# Patient Record
Sex: FEMALE | Race: WHITE | Employment: UNEMPLOYED | URBAN - METROPOLITAN AREA
[De-identification: names, ages, dates, MRNs, and addresses within clinical notes are randomized per-mention and may not be internally consistent; named-entity substitution may affect disease eponyms.]

---

## 2022-03-18 PROBLEM — K57.30 DIVERTICULOSIS OF LARGE INTESTINE WITHOUT DIVERTICULITIS: Status: ACTIVE | Noted: 2022-03-03

## 2022-03-18 PROBLEM — I50.31 ACUTE DIASTOLIC HEART FAILURE (HCC): Status: ACTIVE | Noted: 2022-03-03

## 2022-03-18 PROBLEM — N95.2 ATROPHIC VAGINITIS: Status: ACTIVE | Noted: 2022-03-03

## 2022-03-18 PROBLEM — R26.89 IMPAIRMENT OF BALANCE: Status: ACTIVE | Noted: 2022-03-03

## 2022-03-18 PROBLEM — F41.9 CHRONIC ANXIETY: Status: ACTIVE | Noted: 2022-03-03

## 2022-03-18 PROBLEM — I10 HYPERTENSION: Status: ACTIVE | Noted: 2022-02-04

## 2022-03-18 PROBLEM — G45.9 TRANSIENT ISCHEMIC ATTACK: Status: ACTIVE | Noted: 2018-03-20

## 2022-03-19 PROBLEM — R07.9 CHEST PAIN: Status: ACTIVE | Noted: 2022-03-03

## 2022-03-19 PROBLEM — R00.2 PALPITATIONS: Status: ACTIVE | Noted: 2022-03-03

## 2022-03-19 PROBLEM — H90.8 MIXED CONDUCTIVE AND SENSORINEURAL HEARING LOSS: Status: ACTIVE | Noted: 2022-03-03

## 2022-03-19 PROBLEM — J44.9 CHRONIC OBSTRUCTIVE PULMONARY DISEASE (HCC): Status: ACTIVE | Noted: 2022-02-04

## 2022-03-19 PROBLEM — H91.93 BILATERAL HEARING LOSS: Status: ACTIVE | Noted: 2022-02-04

## 2022-03-19 PROBLEM — I47.29 NONSUSTAINED VENTRICULAR TACHYCARDIA: Status: ACTIVE | Noted: 2022-03-03

## 2022-03-19 PROBLEM — N18.30 STAGE 3 CHRONIC KIDNEY DISEASE (HCC): Status: ACTIVE | Noted: 2022-02-04

## 2022-03-19 PROBLEM — L40.9 PSORIASIS: Status: ACTIVE | Noted: 2022-02-04

## 2022-03-19 PROBLEM — E06.3 HASHIMOTO'S THYROIDITIS: Status: ACTIVE | Noted: 2022-03-03

## 2022-03-19 PROBLEM — R33.9 RETENTION OF URINE: Status: ACTIVE | Noted: 2022-03-03

## 2022-03-20 PROBLEM — R19.8 DISORDER OF ABDOMEN: Status: ACTIVE | Noted: 2022-03-03

## 2022-03-20 PROBLEM — I48.91 ATRIAL FIBRILLATION (HCC): Status: ACTIVE | Noted: 2018-03-20

## 2022-10-02 ENCOUNTER — HOSPITAL ENCOUNTER (INPATIENT)
Age: 77
LOS: 1 days | Discharge: HOME HEALTH | DRG: 092 | End: 2022-10-04
Attending: EMERGENCY MEDICINE | Admitting: FAMILY MEDICINE
Payer: MEDICARE

## 2022-10-02 ENCOUNTER — APPOINTMENT (OUTPATIENT)
Dept: CT IMAGING | Age: 77
DRG: 092 | End: 2022-10-02
Payer: MEDICARE

## 2022-10-02 ENCOUNTER — APPOINTMENT (OUTPATIENT)
Dept: GENERAL RADIOLOGY | Age: 77
DRG: 092 | End: 2022-10-02
Payer: MEDICARE

## 2022-10-02 DIAGNOSIS — R47.9 SPEECH DISTURBANCE, UNSPECIFIED TYPE: ICD-10-CM

## 2022-10-02 DIAGNOSIS — R41.0 CONFUSION: Primary | ICD-10-CM

## 2022-10-02 PROBLEM — L40.9 PSORIASIS: Status: ACTIVE | Noted: 2022-02-04

## 2022-10-02 PROBLEM — F41.9 CHRONIC ANXIETY: Status: ACTIVE | Noted: 2022-03-03

## 2022-10-02 PROBLEM — J44.9 CHRONIC OBSTRUCTIVE PULMONARY DISEASE (HCC): Status: ACTIVE | Noted: 2022-02-04

## 2022-10-02 PROBLEM — T24.001A BURN OF RIGHT LEG: Status: ACTIVE | Noted: 2022-10-02

## 2022-10-02 PROBLEM — R47.1 DYSARTHRIA: Status: ACTIVE | Noted: 2022-10-02

## 2022-10-02 PROBLEM — H90.8 MIXED CONDUCTIVE AND SENSORINEURAL HEARING LOSS: Status: ACTIVE | Noted: 2022-03-03

## 2022-10-02 PROBLEM — I10 HYPERTENSION: Status: ACTIVE | Noted: 2022-02-04

## 2022-10-02 PROBLEM — I48.91 ATRIAL FIBRILLATION (HCC): Status: ACTIVE | Noted: 2018-03-20

## 2022-10-02 LAB
ALBUMIN SERPL-MCNC: 3.4 G/DL (ref 3.2–4.6)
ALBUMIN/GLOB SERPL: 0.9 {RATIO} (ref 1.2–3.5)
ALP SERPL-CCNC: 56 U/L (ref 50–136)
ALT SERPL-CCNC: 16 U/L (ref 12–65)
ANION GAP SERPL CALC-SCNC: 5 MMOL/L (ref 4–13)
AST SERPL-CCNC: 20 U/L (ref 15–37)
BASOPHILS # BLD: 0 K/UL (ref 0–0.2)
BASOPHILS NFR BLD: 1 % (ref 0–2)
BILIRUB SERPL-MCNC: 1.3 MG/DL (ref 0.2–1.1)
BILIRUB UR QL: NEGATIVE
BUN SERPL-MCNC: 17 MG/DL (ref 8–23)
CALCIUM SERPL-MCNC: 9.7 MG/DL (ref 8.3–10.4)
CHLORIDE SERPL-SCNC: 103 MMOL/L (ref 101–110)
CO2 SERPL-SCNC: 30 MMOL/L (ref 21–32)
CREAT SERPL-MCNC: 1 MG/DL (ref 0.6–1)
DIFFERENTIAL METHOD BLD: ABNORMAL
EOSINOPHIL # BLD: 0.2 K/UL (ref 0–0.8)
EOSINOPHIL NFR BLD: 3 % (ref 0.5–7.8)
ERYTHROCYTE [DISTWIDTH] IN BLOOD BY AUTOMATED COUNT: 12.9 % (ref 11.9–14.6)
GLOBULIN SER CALC-MCNC: 3.9 G/DL (ref 2.3–3.5)
GLUCOSE SERPL-MCNC: 101 MG/DL (ref 65–100)
GLUCOSE UR QL STRIP.AUTO: NEGATIVE MG/DL
HCT VFR BLD AUTO: 44.4 % (ref 35.8–46.3)
HGB BLD-MCNC: 14.4 G/DL (ref 11.7–15.4)
IMM GRANULOCYTES # BLD AUTO: 0 K/UL (ref 0–0.5)
IMM GRANULOCYTES NFR BLD AUTO: 0 % (ref 0–5)
KETONES UR-MCNC: NEGATIVE MG/DL
LACTATE SERPL-SCNC: 1.1 MMOL/L (ref 0.4–2)
LACTATE SERPL-SCNC: 1.2 MMOL/L (ref 0.4–2)
LEUKOCYTE ESTERASE UR QL STRIP: NEGATIVE
LYMPHOCYTES # BLD: 1 K/UL (ref 0.5–4.6)
LYMPHOCYTES NFR BLD: 15 % (ref 13–44)
MAGNESIUM SERPL-MCNC: 1.9 MG/DL (ref 1.8–2.4)
MCH RBC QN AUTO: 32.6 PG (ref 26.1–32.9)
MCHC RBC AUTO-ENTMCNC: 32.4 G/DL (ref 31.4–35)
MCV RBC AUTO: 100.5 FL (ref 79.6–97.8)
MONOCYTES # BLD: 0.7 K/UL (ref 0.1–1.3)
MONOCYTES NFR BLD: 11 % (ref 4–12)
NEUTS SEG # BLD: 4.7 K/UL (ref 1.7–8.2)
NEUTS SEG NFR BLD: 70 % (ref 43–78)
NITRITE UR QL: NEGATIVE
NRBC # BLD: 0 K/UL (ref 0–0.2)
PH UR: 6 [PH] (ref 5–9)
PLATELET # BLD AUTO: 175 K/UL (ref 150–450)
PMV BLD AUTO: 9.7 FL (ref 9.4–12.3)
POTASSIUM SERPL-SCNC: 3.8 MMOL/L (ref 3.5–5.1)
PROT SERPL-MCNC: 7.3 G/DL (ref 6.3–8.2)
PROT UR QL: NEGATIVE MG/DL
RBC # BLD AUTO: 4.42 M/UL (ref 4.05–5.2)
RBC # UR STRIP: NEGATIVE /UL
SERVICE CMNT-IMP: NORMAL
SODIUM SERPL-SCNC: 138 MMOL/L (ref 136–145)
SP GR UR: 1.02 (ref 1–1.02)
TSH W FREE THYROID IF ABNORMAL: 0.64 UIU/ML (ref 0.36–3.74)
UROBILINOGEN UR QL: 1 EU/DL (ref 0.2–1)
WBC # BLD AUTO: 6.7 K/UL (ref 4.3–11.1)

## 2022-10-02 PROCEDURE — 94762 N-INVAS EAR/PLS OXIMTRY CONT: CPT

## 2022-10-02 PROCEDURE — 2580000003 HC RX 258: Performed by: EMERGENCY MEDICINE

## 2022-10-02 PROCEDURE — 70450 CT HEAD/BRAIN W/O DYE: CPT

## 2022-10-02 PROCEDURE — 83735 ASSAY OF MAGNESIUM: CPT

## 2022-10-02 PROCEDURE — 71045 X-RAY EXAM CHEST 1 VIEW: CPT

## 2022-10-02 PROCEDURE — 99285 EMERGENCY DEPT VISIT HI MDM: CPT

## 2022-10-02 PROCEDURE — 85025 COMPLETE CBC W/AUTO DIFF WBC: CPT

## 2022-10-02 PROCEDURE — 83605 ASSAY OF LACTIC ACID: CPT

## 2022-10-02 PROCEDURE — G0378 HOSPITAL OBSERVATION PER HR: HCPCS

## 2022-10-02 PROCEDURE — 6370000000 HC RX 637 (ALT 250 FOR IP): Performed by: FAMILY MEDICINE

## 2022-10-02 PROCEDURE — 80053 COMPREHEN METABOLIC PANEL: CPT

## 2022-10-02 PROCEDURE — 94760 N-INVAS EAR/PLS OXIMETRY 1: CPT

## 2022-10-02 PROCEDURE — 2700000000 HC OXYGEN THERAPY PER DAY

## 2022-10-02 PROCEDURE — 2580000003 HC RX 258: Performed by: FAMILY MEDICINE

## 2022-10-02 PROCEDURE — 84443 ASSAY THYROID STIM HORMONE: CPT

## 2022-10-02 PROCEDURE — 36415 COLL VENOUS BLD VENIPUNCTURE: CPT

## 2022-10-02 PROCEDURE — 81003 URINALYSIS AUTO W/O SCOPE: CPT

## 2022-10-02 RX ORDER — ATORVASTATIN CALCIUM 80 MG/1
80 TABLET, FILM COATED ORAL NIGHTLY
Status: DISCONTINUED | OUTPATIENT
Start: 2022-10-02 | End: 2022-10-04 | Stop reason: HOSPADM

## 2022-10-02 RX ORDER — LEVOTHYROXINE SODIUM 88 UG/1
88 TABLET ORAL
Status: DISCONTINUED | OUTPATIENT
Start: 2022-10-03 | End: 2022-10-04 | Stop reason: HOSPADM

## 2022-10-02 RX ORDER — SODIUM CHLORIDE 9 MG/ML
INJECTION, SOLUTION INTRAVENOUS PRN
Status: DISCONTINUED | OUTPATIENT
Start: 2022-10-02 | End: 2022-10-04 | Stop reason: HOSPADM

## 2022-10-02 RX ORDER — ONDANSETRON 4 MG/1
4 TABLET, ORALLY DISINTEGRATING ORAL EVERY 8 HOURS PRN
Status: DISCONTINUED | OUTPATIENT
Start: 2022-10-02 | End: 2022-10-04 | Stop reason: HOSPADM

## 2022-10-02 RX ORDER — ASPIRIN 300 MG/1
300 SUPPOSITORY RECTAL DAILY
Status: DISCONTINUED | OUTPATIENT
Start: 2022-10-02 | End: 2022-10-04 | Stop reason: HOSPADM

## 2022-10-02 RX ORDER — VITAMIN B COMPLEX
1000 TABLET ORAL DAILY
Status: DISCONTINUED | OUTPATIENT
Start: 2022-10-02 | End: 2022-10-04 | Stop reason: HOSPADM

## 2022-10-02 RX ORDER — ONDANSETRON 2 MG/ML
4 INJECTION INTRAMUSCULAR; INTRAVENOUS EVERY 6 HOURS PRN
Status: DISCONTINUED | OUTPATIENT
Start: 2022-10-02 | End: 2022-10-04 | Stop reason: HOSPADM

## 2022-10-02 RX ORDER — ACETAMINOPHEN 325 MG/1
650 TABLET ORAL EVERY 6 HOURS PRN
Status: DISCONTINUED | OUTPATIENT
Start: 2022-10-02 | End: 2022-10-04 | Stop reason: HOSPADM

## 2022-10-02 RX ORDER — SODIUM CHLORIDE 0.9 % (FLUSH) 0.9 %
5-40 SYRINGE (ML) INJECTION PRN
Status: DISCONTINUED | OUTPATIENT
Start: 2022-10-02 | End: 2022-10-04 | Stop reason: HOSPADM

## 2022-10-02 RX ORDER — FLECAINIDE ACETATE 100 MG/1
100 TABLET ORAL 2 TIMES DAILY
Status: DISCONTINUED | OUTPATIENT
Start: 2022-10-02 | End: 2022-10-04 | Stop reason: HOSPADM

## 2022-10-02 RX ORDER — ALBUTEROL SULFATE 90 UG/1
2 AEROSOL, METERED RESPIRATORY (INHALATION) EVERY 4 HOURS PRN
Status: DISCONTINUED | OUTPATIENT
Start: 2022-10-02 | End: 2022-10-04 | Stop reason: HOSPADM

## 2022-10-02 RX ORDER — 0.9 % SODIUM CHLORIDE 0.9 %
500 INTRAVENOUS SOLUTION INTRAVENOUS
Status: COMPLETED | OUTPATIENT
Start: 2022-10-02 | End: 2022-10-02

## 2022-10-02 RX ORDER — ATORVASTATIN CALCIUM 40 MG/1
40 TABLET, FILM COATED ORAL DAILY
Status: DISCONTINUED | OUTPATIENT
Start: 2022-10-02 | End: 2022-10-02

## 2022-10-02 RX ORDER — FAMOTIDINE 20 MG/1
20 TABLET, FILM COATED ORAL 2 TIMES DAILY
Status: DISCONTINUED | OUTPATIENT
Start: 2022-10-02 | End: 2022-10-04 | Stop reason: HOSPADM

## 2022-10-02 RX ORDER — ACETAMINOPHEN 650 MG/1
650 SUPPOSITORY RECTAL EVERY 6 HOURS PRN
Status: DISCONTINUED | OUTPATIENT
Start: 2022-10-02 | End: 2022-10-04 | Stop reason: HOSPADM

## 2022-10-02 RX ORDER — ESCITALOPRAM OXALATE 10 MG/1
10 TABLET ORAL DAILY
Status: DISCONTINUED | OUTPATIENT
Start: 2022-10-02 | End: 2022-10-04 | Stop reason: HOSPADM

## 2022-10-02 RX ORDER — POLYETHYLENE GLYCOL 3350 17 G/17G
17 POWDER, FOR SOLUTION ORAL DAILY PRN
Status: DISCONTINUED | OUTPATIENT
Start: 2022-10-02 | End: 2022-10-04 | Stop reason: HOSPADM

## 2022-10-02 RX ORDER — SODIUM CHLORIDE 0.9 % (FLUSH) 0.9 %
5-40 SYRINGE (ML) INJECTION EVERY 12 HOURS SCHEDULED
Status: DISCONTINUED | OUTPATIENT
Start: 2022-10-02 | End: 2022-10-04 | Stop reason: HOSPADM

## 2022-10-02 RX ORDER — LABETALOL HYDROCHLORIDE 5 MG/ML
10 INJECTION, SOLUTION INTRAVENOUS EVERY 10 MIN PRN
Status: DISCONTINUED | OUTPATIENT
Start: 2022-10-02 | End: 2022-10-04 | Stop reason: HOSPADM

## 2022-10-02 RX ORDER — ASPIRIN 81 MG/1
81 TABLET ORAL DAILY
Status: DISCONTINUED | OUTPATIENT
Start: 2022-10-02 | End: 2022-10-04 | Stop reason: HOSPADM

## 2022-10-02 RX ADMIN — FAMOTIDINE 20 MG: 20 TABLET, FILM COATED ORAL at 21:17

## 2022-10-02 RX ADMIN — APIXABAN 5 MG: 5 TABLET, FILM COATED ORAL at 15:49

## 2022-10-02 RX ADMIN — FLECAINIDE ACETATE 100 MG: 100 TABLET ORAL at 21:17

## 2022-10-02 RX ADMIN — SODIUM CHLORIDE 500 ML: 9 INJECTION, SOLUTION INTRAVENOUS at 10:14

## 2022-10-02 RX ADMIN — APIXABAN 5 MG: 5 TABLET, FILM COATED ORAL at 21:17

## 2022-10-02 RX ADMIN — ASPIRIN 81 MG: 81 TABLET ORAL at 15:49

## 2022-10-02 RX ADMIN — VITAMIN D, TAB 1000IU (100/BT) 1000 UNITS: 25 TAB at 15:50

## 2022-10-02 RX ADMIN — SODIUM CHLORIDE, PRESERVATIVE FREE 5 ML: 5 INJECTION INTRAVENOUS at 21:17

## 2022-10-02 RX ADMIN — ACETAMINOPHEN 650 MG: 325 TABLET, FILM COATED ORAL at 21:16

## 2022-10-02 RX ADMIN — FAMOTIDINE 20 MG: 20 TABLET, FILM COATED ORAL at 15:49

## 2022-10-02 RX ADMIN — FLECAINIDE ACETATE 100 MG: 100 TABLET ORAL at 15:50

## 2022-10-02 RX ADMIN — ATORVASTATIN CALCIUM 80 MG: 80 TABLET, FILM COATED ORAL at 21:17

## 2022-10-02 ASSESSMENT — PAIN SCALES - GENERAL
PAINLEVEL_OUTOF10: 5
PAINLEVEL_OUTOF10: 0
PAINLEVEL_OUTOF10: 1

## 2022-10-02 ASSESSMENT — ENCOUNTER SYMPTOMS
COLOR CHANGE: 0
ABDOMINAL PAIN: 0
DIARRHEA: 0
NAUSEA: 0
SHORTNESS OF BREATH: 0
FACIAL SWELLING: 0
EYE DISCHARGE: 0

## 2022-10-02 ASSESSMENT — PAIN DESCRIPTION - LOCATION
LOCATION: LEG
LOCATION: GENERALIZED

## 2022-10-02 ASSESSMENT — PAIN - FUNCTIONAL ASSESSMENT
PAIN_FUNCTIONAL_ASSESSMENT: 0-10
PAIN_FUNCTIONAL_ASSESSMENT: ACTIVITIES ARE NOT PREVENTED

## 2022-10-02 ASSESSMENT — PAIN DESCRIPTION - ORIENTATION: ORIENTATION: LOWER;RIGHT

## 2022-10-02 ASSESSMENT — PAIN DESCRIPTION - DESCRIPTORS
DESCRIPTORS: SORE
DESCRIPTORS: ACHING
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
## 2022-10-02 NOTE — ED NOTES
Report given to [facility]. Transfer of care at this time.      Sue Salinas RN  10/02/22 0951
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
## 2022-10-03 ENCOUNTER — APPOINTMENT (OUTPATIENT)
Dept: NON INVASIVE DIAGNOSTICS | Age: 77
DRG: 092 | End: 2022-10-03
Payer: MEDICARE

## 2022-10-03 ENCOUNTER — APPOINTMENT (OUTPATIENT)
Dept: MRI IMAGING | Age: 77
DRG: 092 | End: 2022-10-03
Payer: MEDICARE

## 2022-10-03 ENCOUNTER — HOME HEALTH ADMISSION (OUTPATIENT)
Dept: HOME HEALTH SERVICES | Facility: HOME HEALTH | Age: 77
End: 2022-10-03
Payer: MEDICARE

## 2022-10-03 PROBLEM — R41.0 CONFUSION: Status: ACTIVE | Noted: 2022-10-03

## 2022-10-03 LAB
ANION GAP SERPL CALC-SCNC: 6 MMOL/L (ref 4–13)
BASOPHILS # BLD: 0 K/UL (ref 0–0.2)
BASOPHILS NFR BLD: 0 % (ref 0–2)
BUN SERPL-MCNC: 11 MG/DL (ref 8–23)
CALCIUM SERPL-MCNC: 9.2 MG/DL (ref 8.3–10.4)
CHLORIDE SERPL-SCNC: 103 MMOL/L (ref 101–110)
CHOLEST SERPL-MCNC: 130 MG/DL
CO2 SERPL-SCNC: 30 MMOL/L (ref 21–32)
CREAT SERPL-MCNC: 0.7 MG/DL (ref 0.6–1)
DIFFERENTIAL METHOD BLD: ABNORMAL
ECHO AO ASC DIAM: 2.6 CM
ECHO AO ASCENDING AORTA INDEX: 1.63 CM/M2
ECHO AO ROOT DIAM: 3.5 CM
ECHO AO ROOT INDEX: 2.19 CM/M2
ECHO AV AREA PEAK VELOCITY: 2.7 CM2
ECHO AV AREA VTI: 2.4 CM2
ECHO AV AREA/BSA PEAK VELOCITY: 1.7 CM2/M2
ECHO AV AREA/BSA VTI: 1.5 CM2/M2
ECHO AV MEAN GRADIENT: 3 MMHG
ECHO AV MEAN VELOCITY: 0.8 M/S
ECHO AV PEAK GRADIENT: 4 MMHG
ECHO AV PEAK VELOCITY: 1 M/S
ECHO AV VELOCITY RATIO: 0.9
ECHO AV VTI: 20.2 CM
ECHO BSA: 1.57 M2
ECHO EST RA PRESSURE: 3 MMHG
ECHO IVC PROX: 1.3 CM
ECHO LA AREA 2C: 17.7 CM2
ECHO LA AREA 4C: 18 CM2
ECHO LA DIAMETER INDEX: 2.38 CM/M2
ECHO LA DIAMETER: 3.8 CM
ECHO LA MAJOR AXIS: 5 CM
ECHO LA MINOR AXIS: 5.7 CM
ECHO LA TO AORTIC ROOT RATIO: 1.09
ECHO LA VOL BP: 51 ML (ref 22–52)
ECHO LA VOL/BSA BIPLANE: 32 ML/M2 (ref 16–34)
ECHO LV E' LATERAL VELOCITY: 7 CM/S
ECHO LV E' SEPTAL VELOCITY: 5 CM/S
ECHO LV EDV A2C: 39 ML
ECHO LV EDV A4C: 39 ML
ECHO LV EDV INDEX A4C: 24 ML/M2
ECHO LV EDV NDEX A2C: 24 ML/M2
ECHO LV EJECTION FRACTION A2C: 58 %
ECHO LV EJECTION FRACTION A4C: 65 %
ECHO LV EJECTION FRACTION BIPLANE: 61 % (ref 55–100)
ECHO LV ESV A2C: 16 ML
ECHO LV ESV A4C: 14 ML
ECHO LV ESV INDEX A2C: 10 ML/M2
ECHO LV ESV INDEX A4C: 9 ML/M2
ECHO LV FRACTIONAL SHORTENING: 38 % (ref 28–44)
ECHO LV INTERNAL DIMENSION DIASTOLE INDEX: 2 CM/M2
ECHO LV INTERNAL DIMENSION DIASTOLIC: 3.2 CM (ref 3.9–5.3)
ECHO LV INTERNAL DIMENSION SYSTOLIC INDEX: 1.25 CM/M2
ECHO LV INTERNAL DIMENSION SYSTOLIC: 2 CM
ECHO LV IVSD: 0.8 CM (ref 0.6–0.9)
ECHO LV MASS 2D: 77.3 G (ref 67–162)
ECHO LV MASS INDEX 2D: 48.3 G/M2 (ref 43–95)
ECHO LV POSTERIOR WALL DIASTOLIC: 1 CM (ref 0.6–0.9)
ECHO LV RELATIVE WALL THICKNESS RATIO: 0.63
ECHO LVOT AREA: 3.1 CM2
ECHO LVOT AV VTI INDEX: 0.78
ECHO LVOT DIAM: 2 CM
ECHO LVOT MEAN GRADIENT: 2 MMHG
ECHO LVOT PEAK GRADIENT: 3 MMHG
ECHO LVOT PEAK VELOCITY: 0.9 M/S
ECHO LVOT STROKE VOLUME INDEX: 30.8 ML/M2
ECHO LVOT SV: 49.3 ML
ECHO LVOT VTI: 15.7 CM
ECHO MV A VELOCITY: 0.26 M/S
ECHO MV E DECELERATION TIME (DT): 335 MS
ECHO MV E VELOCITY: 0.84 M/S
ECHO MV E/A RATIO: 3.23
ECHO MV E/E' LATERAL: 12
ECHO MV E/E' RATIO (AVERAGED): 14.4
ECHO MV E/E' SEPTAL: 16.8
ECHO PULMONARY ARTERY END DIASTOLIC PRESSURE: 8 MMHG
ECHO PV ACCELERATION TIME (AT): 92 MS
ECHO PV MAX VELOCITY: 0.7 M/S
ECHO PV PEAK GRADIENT: 2 MMHG
ECHO PV REGURGITANT MAX VELOCITY: 1.4 M/S
ECHO RIGHT VENTRICULAR SYSTOLIC PRESSURE (RVSP): 39 MMHG
ECHO RV BASAL DIMENSION: 2.7 CM
ECHO RV INTERNAL DIMENSION: 2.5 CM
ECHO RV TAPSE: 2.5 CM (ref 1.7–?)
ECHO TV REGURGITANT MAX VELOCITY: 3.01 M/S
ECHO TV REGURGITANT PEAK GRADIENT: 36 MMHG
EOSINOPHIL # BLD: 0.3 K/UL (ref 0–0.8)
EOSINOPHIL NFR BLD: 4 % (ref 0.5–7.8)
ERYTHROCYTE [DISTWIDTH] IN BLOOD BY AUTOMATED COUNT: 13 % (ref 11.9–14.6)
EST. AVERAGE GLUCOSE BLD GHB EST-MCNC: 120 MG/DL
GLUCOSE SERPL-MCNC: 94 MG/DL (ref 65–100)
HBA1C MFR BLD: 5.8 % (ref 4.8–5.6)
HCT VFR BLD AUTO: 41.6 % (ref 35.8–46.3)
HDLC SERPL-MCNC: 43 MG/DL (ref 40–60)
HDLC SERPL: 3 {RATIO}
HGB BLD-MCNC: 13.5 G/DL (ref 11.7–15.4)
IMM GRANULOCYTES # BLD AUTO: 0 K/UL (ref 0–0.5)
IMM GRANULOCYTES NFR BLD AUTO: 0 % (ref 0–5)
LDLC SERPL CALC-MCNC: 72.8 MG/DL
LV EF: 63 %
LVEF MODALITY: ABNORMAL
LYMPHOCYTES # BLD: 1.7 K/UL (ref 0.5–4.6)
LYMPHOCYTES NFR BLD: 23 % (ref 13–44)
MCH RBC QN AUTO: 31.8 PG (ref 26.1–32.9)
MCHC RBC AUTO-ENTMCNC: 32.5 G/DL (ref 31.4–35)
MCV RBC AUTO: 97.9 FL (ref 79.6–97.8)
MONOCYTES # BLD: 1 K/UL (ref 0.1–1.3)
MONOCYTES NFR BLD: 13 % (ref 4–12)
NEUTS SEG # BLD: 4.4 K/UL (ref 1.7–8.2)
NEUTS SEG NFR BLD: 60 % (ref 43–78)
NRBC # BLD: 0 K/UL (ref 0–0.2)
PLATELET # BLD AUTO: 167 K/UL (ref 150–450)
PMV BLD AUTO: 10.1 FL (ref 9.4–12.3)
POTASSIUM SERPL-SCNC: 3.6 MMOL/L (ref 3.5–5.1)
RBC # BLD AUTO: 4.25 M/UL (ref 4.05–5.2)
SODIUM SERPL-SCNC: 139 MMOL/L (ref 136–145)
TRIGL SERPL-MCNC: 71 MG/DL (ref 35–150)
VLDLC SERPL CALC-MCNC: 14.2 MG/DL (ref 6–23)
WBC # BLD AUTO: 7.4 K/UL (ref 4.3–11.1)

## 2022-10-03 PROCEDURE — 97166 OT EVAL MOD COMPLEX 45 MIN: CPT

## 2022-10-03 PROCEDURE — 36415 COLL VENOUS BLD VENIPUNCTURE: CPT

## 2022-10-03 PROCEDURE — 70553 MRI BRAIN STEM W/O & W/DYE: CPT

## 2022-10-03 PROCEDURE — 93306 TTE W/DOPPLER COMPLETE: CPT | Performed by: INTERNAL MEDICINE

## 2022-10-03 PROCEDURE — 80048 BASIC METABOLIC PNL TOTAL CA: CPT

## 2022-10-03 PROCEDURE — 6370000000 HC RX 637 (ALT 250 FOR IP): Performed by: FAMILY MEDICINE

## 2022-10-03 PROCEDURE — 92610 EVALUATE SWALLOWING FUNCTION: CPT

## 2022-10-03 PROCEDURE — 99221 1ST HOSP IP/OBS SF/LOW 40: CPT | Performed by: PSYCHIATRY & NEUROLOGY

## 2022-10-03 PROCEDURE — A9579 GAD-BASE MR CONTRAST NOS,1ML: HCPCS | Performed by: PSYCHIATRY & NEUROLOGY

## 2022-10-03 PROCEDURE — 97530 THERAPEUTIC ACTIVITIES: CPT

## 2022-10-03 PROCEDURE — 97162 PT EVAL MOD COMPLEX 30 MIN: CPT

## 2022-10-03 PROCEDURE — G0378 HOSPITAL OBSERVATION PER HR: HCPCS

## 2022-10-03 PROCEDURE — 6360000004 HC RX CONTRAST MEDICATION: Performed by: PSYCHIATRY & NEUROLOGY

## 2022-10-03 PROCEDURE — APPNB30 APP NON BILLABLE TIME 0-30 MINS: Performed by: NURSE PRACTITIONER

## 2022-10-03 PROCEDURE — 97535 SELF CARE MNGMENT TRAINING: CPT

## 2022-10-03 PROCEDURE — 70544 MR ANGIOGRAPHY HEAD W/O DYE: CPT

## 2022-10-03 PROCEDURE — 2580000003 HC RX 258: Performed by: FAMILY MEDICINE

## 2022-10-03 PROCEDURE — 6370000000 HC RX 637 (ALT 250 FOR IP): Performed by: NURSE PRACTITIONER

## 2022-10-03 PROCEDURE — 80061 LIPID PANEL: CPT

## 2022-10-03 PROCEDURE — C8929 TTE W OR WO FOL WCON,DOPPLER: HCPCS

## 2022-10-03 PROCEDURE — 70548 MR ANGIOGRAPHY NECK W/DYE: CPT

## 2022-10-03 PROCEDURE — 6360000004 HC RX CONTRAST MEDICATION: Performed by: FAMILY MEDICINE

## 2022-10-03 PROCEDURE — 83036 HEMOGLOBIN GLYCOSYLATED A1C: CPT

## 2022-10-03 PROCEDURE — 85025 COMPLETE CBC W/AUTO DIFF WBC: CPT

## 2022-10-03 RX ORDER — METOPROLOL SUCCINATE 50 MG/1
50 TABLET, EXTENDED RELEASE ORAL DAILY
Status: DISCONTINUED | OUTPATIENT
Start: 2022-10-03 | End: 2022-10-04 | Stop reason: HOSPADM

## 2022-10-03 RX ORDER — FUROSEMIDE 20 MG/1
20 TABLET ORAL DAILY
Status: DISCONTINUED | OUTPATIENT
Start: 2022-10-03 | End: 2022-10-04 | Stop reason: HOSPADM

## 2022-10-03 RX ORDER — LISINOPRIL 5 MG/1
5 TABLET ORAL DAILY
Status: DISCONTINUED | OUTPATIENT
Start: 2022-10-03 | End: 2022-10-04

## 2022-10-03 RX ADMIN — ESCITALOPRAM OXALATE 10 MG: 10 TABLET ORAL at 09:48

## 2022-10-03 RX ADMIN — APIXABAN 5 MG: 5 TABLET, FILM COATED ORAL at 20:46

## 2022-10-03 RX ADMIN — GADOTERIDOL 20 ML: 279.3 INJECTION, SOLUTION INTRAVENOUS at 13:34

## 2022-10-03 RX ADMIN — FAMOTIDINE 20 MG: 20 TABLET, FILM COATED ORAL at 09:48

## 2022-10-03 RX ADMIN — VITAMIN D, TAB 1000IU (100/BT) 1000 UNITS: 25 TAB at 09:48

## 2022-10-03 RX ADMIN — ATORVASTATIN CALCIUM 80 MG: 80 TABLET, FILM COATED ORAL at 20:45

## 2022-10-03 RX ADMIN — LEVOTHYROXINE SODIUM 88 MCG: 0.09 TABLET ORAL at 06:06

## 2022-10-03 RX ADMIN — FAMOTIDINE 20 MG: 20 TABLET, FILM COATED ORAL at 20:45

## 2022-10-03 RX ADMIN — LISINOPRIL 5 MG: 5 TABLET ORAL at 09:48

## 2022-10-03 RX ADMIN — FLECAINIDE ACETATE 100 MG: 100 TABLET ORAL at 20:45

## 2022-10-03 RX ADMIN — SODIUM CHLORIDE, PRESERVATIVE FREE 10 ML: 5 INJECTION INTRAVENOUS at 09:55

## 2022-10-03 RX ADMIN — FLECAINIDE ACETATE 100 MG: 100 TABLET ORAL at 09:48

## 2022-10-03 RX ADMIN — APIXABAN 5 MG: 5 TABLET, FILM COATED ORAL at 09:48

## 2022-10-03 RX ADMIN — METOPROLOL SUCCINATE 50 MG: 50 TABLET, FILM COATED, EXTENDED RELEASE ORAL at 09:48

## 2022-10-03 RX ADMIN — FUROSEMIDE 20 MG: 20 TABLET ORAL at 09:48

## 2022-10-03 RX ADMIN — SODIUM CHLORIDE, PRESERVATIVE FREE 5 ML: 5 INJECTION INTRAVENOUS at 20:46

## 2022-10-03 RX ADMIN — PERFLUTREN 0.15 ML: 6.52 INJECTION, SUSPENSION INTRAVENOUS at 10:29

## 2022-10-03 ASSESSMENT — PAIN SCALES - GENERAL
PAINLEVEL_OUTOF10: 0
PAINLEVEL_OUTOF10: 0

## 2022-10-03 NOTE — CONSULTS
Consult    Patient: Cheryl Flores MRN: [id number]     YOB: 1945  Age: 65 y.o. Sex: female      Subjective:      Cheryl Flores is a 65 y.o. female who is being seen for altered mental status. The patient states that she is from [place] and recently sold her home to live with family in [facility] and [place]. She apparently left a voicemail in which she thought she was still in [place]. There was concern for an acute change in mental status so she was brought to the hospital for further assessment. Currently, she is calm and relaxed and answers all questions. Past Medical History:   Diagnosis Date    Abdominal adhesions     Atrial fibrillation [facility])     s/p cardioversion 7/08    Atrophic vaginitis     Balance disorder     Bleeding gastric erosion     has had EGD    Cerebrovascular accident (CVA) ([facility] [address]     Chronic anxiety     COPD (chronic obstructive pulmonary disease) ([facility] [address]     Diverticulosis     Dyslipidemia     Factor V Leiden mutation ([facility] [address]     First degree AV block     see's Dr. Tessie Nieto    Hashimoto's thyroiditis     Hx pulmonary embolism     Hypertension     Hypothyroid     Mixed hearing loss     Nonsustained ventricular tachycardia     Palpitation     PUD (peptic ulcer disease)     Patient had a small esophageal ulcer. Follow-up EGD showed complete resolution    Retinitis pigmentosa     Small bowel obstruction (HCC)     TIA (transient ischemic attack)     Tobacco use     Urinary retention     Varicose veins of both lower extremities     has seen vascular     Past Surgical History:   Procedure Laterality Date    APPENDECTOMY      COLONOSCOPY  10/29/2015    HX PARTIAL COLECTOMY Right 06/22/2014    HYSTERECTOMY, TOTAL ABDOMINAL (CERVIX REMOVED)      LAPAROSCOPY  12/11/2014    Exploratory laparotomy with extensive lysis of adhesions    SKIN BIOPSY  10/06/2020    skin biopsy of back: Atypical squamoproliferative lesion.     SKIN BIOPSY  06/08/2020    skin right lower leg: Hemorrhage, fibrosis (scarring), and nonspecific inflammation, negative for tumor    THYROIDECTOMY      UPPER GASTROINTESTINAL ENDOSCOPY  2021    EGD      Family History   Problem Relation Age of Onset    Cancer Other     Cancer Mother     Cancer Father     Stroke Other      Social History     Tobacco Use    Smoking status: Former     Types: Cigarettes     Quit date: 2010     Years since quittin.7    Smokeless tobacco: Never   Substance Use Topics    Alcohol use: Not on file      Current Facility-Administered Medications   Medication Dose Route Frequency Provider Last Rate Last Admin    metoprolol succinate (TOPROL XL) extended release tablet 50 mg  50 mg Oral Daily Estil Sprain, APRN - CNP   50 mg at 10/03/22 0948    lisinopril (PRINIVIL;ZESTRIL) tablet 5 mg  5 mg Oral Daily Estil Sprain, APRN - CNP   5 mg at 10/03/22 0948    furosemide (LASIX) tablet 20 mg  20 mg Oral Daily Estil Sprain, APRN - CNP   20 mg at 10/03/22 0948    sodium chloride flush 0.9 % injection 5-40 mL  5-40 mL IntraVENous 2 times per day Travis Marcelino MD   10 mL at 10/03/22 0955    sodium chloride flush 0.9 % injection 5-40 mL  5-40 mL IntraVENous PRN Travis Marcelino MD        0.9 % sodium chloride infusion   IntraVENous PRN Travis Marcelino MD        ondansetron (ZOFRAN-ODT) disintegrating tablet 4 mg  4 mg Oral Q8H PRN Travis Marcelino MD        Or    ondansetron (ZOFRAN) injection 4 mg  4 mg IntraVENous Q6H PRN Travis Marcelino MD        polyethylene glycol (GLYCOLAX) packet 17 g  17 g Oral Daily PRN Travis Marcelino MD        acetaminophen (TYLENOL) tablet 650 mg  650 mg Oral Q6H PRN Travis Marcelino MD   650 mg at 10/02/22 2116    Or    acetaminophen (TYLENOL) suppository 650 mg  650 mg Rectal Q6H PRN Travis Marcelino MD        albuterol sulfate HFA (PROVENTIL;VENTOLIN;PROAIR) 108 (90 Base) MCG/ACT inhaler 2 puff  2 puff Inhalation Q4H PRN Travis Marcelino MD        apixaban (ELIQUIS) tablet 5 mg  5 mg Oral BID Travis Marcelino MD   5 mg at 10/03/22 0948    escitalopram (LEXAPRO) tablet 10 mg  10 mg Oral Daily Radha Menjivar MD   10 mg at 10/03/22 0948    famotidine (PEPCID) tablet 20 mg  20 mg Oral BID Radha Menjivar MD   20 mg at 10/03/22 0948    flecainide (TAMBOCOR) tablet 100 mg  100 mg Oral BID Radha Menjivar MD   100 mg at 10/03/22 0948    levothyroxine (SYNTHROID) tablet 88 mcg  88 mcg Oral QAM AC Radha Menjivar MD   88 mcg at 10/03/22 0606    vitamin D (CHOLECALCIFEROL) tablet 1,000 Units  1,000 Units Oral Daily Radha Menjivar MD   1,000 Units at 10/03/22 0123    aspirin EC tablet 81 mg  81 mg Oral Daily Radha Menjivar MD   81 mg at 10/02/22 1549    Or    aspirin suppository 300 mg  300 mg Rectal Daily Radha Menjivar MD        labetalol (NORMODYNE;TRANDATE) injection 10 mg  10 mg IntraVENous Q10 Min PRN Radha Menjivar MD        atorvastatin (LIPITOR) tablet 80 mg  80 mg Oral Nightly Radha Menjivar MD   80 mg at 10/02/22 2117        Allergies   Allergen Reactions    Adhesive Tape Itching    Menthol      Other reaction(s): Unknown (comments)    Sulfadiazine      Other reaction(s): Unknown (comments)    Sulfites Other (See Comments)       Review of Systems:  CONSTITUTIONAL: No weight loss, fever, chills, weakness or fatigue. HEENT: Eyes: No visual loss, blurred vision, double vision or yellow sclerae. Ears, Nose, Throat: No hearing loss, sneezing, congestion, runny nose or sore throat. SKIN: No rash or itching. CARDIOVASCULAR: No chest pain, chest pressure or chest discomfort. No palpitations or edema. Objective:     Vitals:    10/02/22 0834 10/03/22 0000 10/03/22 0400 10/03/22 0800   BP:  126/76 135/76 (!) 152/88   Pulse:  67 73 74   Resp:  18 18 16   Temp:  98.4 °F (36.9 °C) 98.2 °F (36.8 °C) 98.8 °F (37.1 °C)   TempSrc:  Oral Oral Oral   SpO2:  94% 94% 96%   Weight: 115 lb (52.2 kg)      Height: 5' 7\" (1.702 m)           Physical Exam:  General - Well developed, well nourished, in no apparent distress. Pleasant and conversant. HEENT - Normocephalic, atraumatic. Conjunctiva, tympanic membranes, and oropharynx are clear. Neck - Supple without masses, no bruits   Cardiovascular - Regular rate and rhythm. Normal S1, S2 without murmurs, rubs, or gallops. Lungs - Clear to auscultation. Abdomen - Soft, nontender with normal bowel sounds. Extremities - Peripheral pulses intact. No edema and no rashes. Neurological examination -   Comprehension, attention , memory and reasoning are intact. Poor orientation. Language and speech are normal. On cranial nerve examination pupils are equal round and reactive to light. Extraocular motility is normal. Face is mildly asymmetric with a possible droop on the left. Hearing is intact to finger rustle bilaterally. Motor examination - There is normal muscle tone and bulk. Power is full throughout. Muscle stretch reflexes are normoactive and there are no pathological reflexes present. Sensation is intact to light touch, pinprick, vibration and proprioception in all extremities. Cerebellar examination is normal.     NIHSS   NIHSS Score: 1  1a-Level of Consciousness 0  1b-What is Month/Age 0  1c-Open/Close Eyes&Hand 0  2 -Best Gaze 0  3 -Visual Fields 0  4 -Facial Palsy 1  5a-Motor-Left Arm 0  5b-Motor-Right Arm 0  6a-Motor-Left Leg 0  6b-Motor-Right Leg 0  7 -Limb Ataxia 0  8 -Sensory 0  9 -Best Language 0  10-Dysarthria 0  11-Extinction/Inattention 0    Lab Results   Component Value Date/Time    CHOL 130 10/03/2022 04:54 AM    HDL 43 10/03/2022 04:54 AM    HDL 43 04/17/2021 12:00 AM    LDLC 41 04/17/2021 12:00 AM        No results found for: HBA1C, RPX2DXIN     CT Results (most recent): Personally Reviewed   Results for orders placed during the hospital encounter of 10/02/22    CT HEAD WO CONTRAST    Narrative  History: AMS, confusion    Exam: CT head without contrast    Technique: Thin section axial CT images were obtained from the skullbase through  the vertex.  Radiation dose reduction techniques were used for this study. Our  CT scanners use one or all of the following: Automated exposure control,  adjustment of the mA and/or kV according to patient size, use of iterative  reconstruction. Findings: The ventricles are normal in size, shape, and position. There is right  posterior frontal encephalomalacia present. Confluent white matter hypodensity  present in the corona radiata and centrum semiovale. There is generalized  cerebral atrophy. No extra-axial fluid collection is present. There is no mass  or mass-effect. The basilar cisterns are patent. The paranasal sinuses and  mastoid air cells are clear. Impression  Chronic appearing changes without acute intracranial abnormality. Assessment and Plan    59-year-old woman with altered mentation of unclear etiology. Recommendations  MRI was ordered by primary team. We will follow up on results      Management of Delirium     Non-pharmacological intervention  Reorient the patient throughout the day  Window open and lights on during the day. Lights off, television off, noises down during the night. If able, decrease nursing checks at night  Therapies as often as possible  Avoid restraints to the best of your ability   Avoid sensory deprivation by using glasses and hearing aids, if applicable       Pharmacological intervention  Replete electrolyte abnormalities and correct metabolic abnormalities  Limit benzodiazepines, antihistamines, narcotics, anticholinergics. Preference towards Precedex for sedation over fentanyl and benzodiazepines/GABAa agonists.    For dangerous behavior/aggression to self or others can consider Zyprexa or Seroquel if benefits outweigh risk  For persistent insomnia can use melatonin four hours prior to bedtime or Seroquel 25 mg at bedtime

## 2022-10-03 NOTE — PROGRESS NOTES
ACUTE OCCUPATIONAL THERAPY GOALS:   (Developed with and agreed upon by patient and/or caregiver.)  1. Patient will complete lower body bathing and dressing with setup and adaptive equipment as needed. 2. Patient will complete toileting with supervision. 3. Patient will tolerate 30 minutes of OT treatment with as neeeded rest breaks to increase activity tolerance for ADLs. 4. Patient will complete functional transfers with supervision and adaptive equipment as needed. Timeframe: 7 visits       OCCUPATIONAL THERAPY Initial Assessment and Daily Note       OT Visit Days: 1  Acknowledge Orders  Time  OT Charge Capture  Rehab Caseload Tracker      Forest Higginbotham is a 65 y.o. female   PRIMARY DIAGNOSIS: Dysarthria  Dysarthria [R47.1]  Confusion [R41.0]  Speech disturbance, unspecified type [R47.9]       Reason for Referral: Other lack of cordination (R27.8)  Observation: Payor: MEDICARE / Plan: MEDICARE PART A AND B / Product Type: *No Product type* /     ASSESSMENT:     REHAB RECOMMENDATIONS:   Recommendation to date pending progress:  Setting:  No further skilled therapy after discharge from hospital. Would benefit from 24/7 supervision for higher level IADLS    Equipment:    To Be Determined     ASSESSMENT:  Ms. Ida Le is a 66 y/o F who presented after episode of confusion, currently undergoing workup. Relevant PMH of CVA, legal blindness, & [place]. Has been staying with her daughter in [place] since moving from [place]. Reports independence with all ADLs/ IADLs except cooking due to vision loss. Today reports no pain. BUE are [facility] for ROM, strength, coordination, sensation. Mobile with CGA & RW with occaisonal cues for safety. Participated in ADLs with SBA and verbal cues. She would benefit from continued OT to increase independence and safety. Will follow.  Jayne Davila AM-PAC 6 Clicks Daily Activity Inpatient Short Form:    AM-PAC Daily Activity Inpatient   How much help for putting on and taking off regular lower body clothing?: A Little  How much help for Bathing?: A Little  How much help for Toileting?: A Little  How much help for putting on and taking off regular upper body clothing?: None  How much help for taking care of personal grooming?: None  How much help for eating meals?: None  AM-[facility] Inpatient Daily Activity Raw Score: 21  AM-PAC Inpatient ADL T-Scale Score : 44.27  ADL Inpatient CMS 0-100% Score: 32.79  ADL Inpatient CMS G-Code Modifier : CJ           SUBJECTIVE:     Ms. Carlos Alberto Huerta states, \"I know y'all don't like those 4 wheeled walkers. \"     Social/Functional Lives With: Daughter (temporarily?)  Home Equipment: Rollator, Walker, rolling  Has the patient had two or more falls in the past year or any fall with injury in the past year?: No  Receives Help From: Family  ADL Assistance: Independent  Homemaking Assistance: Needs assistance  Meal Prep: Other (comment) (needs assist due to low vision)  Ambulation Assistance: Independent (rollator as needed)  Active : No    OBJECTIVE:     Saira Figueroa / Reggie Tierney / AIRWAY: IV    RESTRICTIONS/PRECAUTIONS:  Restrictions/Precautions: Fall Risk    PAIN: VITALS / O2:   Pre Treatment:   Pain Assessment: None - Denies Pain      Post Treatment: 0         Vitals          Oxygen            GROSS EVALUATION: INTACT IMPAIRED   (See Comments)   UE AROM [x] []   UE PROM [x] []   Strength [x] BUE 4+/5 to 5/5      Posture / Balance [] Posture: Good  Sitting - Static: Good  Sitting - Dynamic: Good  Standing - Static: Fair, +  Standing - Dynamic: Fair   Sensation [x]     Coordination [x]       Tone [x]       Edema [x]    Activity Tolerance [x]       Hand Dominance R [x] L []      COGNITION/  PERCEPTION: INTACT IMPAIRED   (See Comments)   Orientation []  Oriented to name, birthday, date, place, situation.  Disoriented to year    Vision []  Baseline impaired peripheral vision; legally blind   Hearing []  [place]; wears hearing aids    Cognition  [] Overall Cognitive Status: Exceptions  Safety Judgement: Decreased awareness of need for assistance  Insights: Decreased awareness of deficits   Perception []       MOBILITY: I Mod I S SBA CGA Min Mod Max Total  NT x2 Comments:   Bed Mobility    Rolling [] [] [] [] [] [] [] [] [] [x] []    Supine to Sit [] [] [] [x] [] [] [] [] [] [] []    Scooting [] [] [] [x] [] [] [] [] [] [] []    Sit to Supine [] [] [] [] [] [] [] [] [] [x] []    Transfers    Sit to Stand [] [] [] [] [] [x] [] [] [] [] []    Bed to Chair [] [] [] [] [x] [] [] [] [] [] []    Stand to Sit [] [] [] [] [x] [] [] [] [] [] []    Tub/Shower [] [] [] [] [] [] [] [] [] [x] []     Toilet [] [] [] [] [] [] [] [] [] [x] []      [] [] [] [] [] [] [] [] [] [] []    I=Independent, Mod I=Modified Independent, S=Supervision/Setup, SBA=Standby Assistance, CGA=Contact Guard Assistance, Min=Minimal Assistance, Mod=Moderate Assistance, Max=Maximal Assistance, Total=Total Assistance, NT=Not Tested    ACTIVITIES OF DAILY LIVING: I Mod I S SBA CGA Min Mod Max Total NT Comments   BASIC ADLs:              Upper Body Bathing  [] [] [] [] [] [] [] [] [] []    Lower Body Bathing [] [] [] [] [] [] [] [] [] []    Toileting [] [] [] [] [] [] [] [] [] []    Upper Body Dressing [] [] [] [] [x] [x] [] [] [] [] Gown, tshirt, sweat shirt, including zipping    Lower Body Dressing [] [] [] [] [] [] [] [] [] []    Feeding [] [] [] [] [] [] [] [] [] []    Grooming [] [] [] [x] [] [] [] [] [] [] Brushing teeth in standing at sink   Personal Device Care [] [] [] [] [] [] [] [] [] []    Functional Mobility [] [] [] [] [x] [x] [] [] [] []    I=Independent, Mod I=Modified Independent, S=Supervision/Setup, SBA=Standby Assistance, CGA=Contact Guard Assistance, Min=Minimal Assistance, Mod=Moderate Assistance, Max=Maximal Assistance, Total=Total Assistance, NT=Not Tested    PLAN:   FREQUENCY/DURATION   OT Plan of Care: 3 times/week for duration of hospital stay or until stated goals are met, whichever comes first.    PROBLEM LIST:   (Skilled intervention is medically necessary to address:)  Decreased ADL/Functional Activities  Decreased Activity Tolerance  Decreased Balance  Decreased Safety Awareness  Decreased Transfer Abilities   INTERVENTIONS PLANNED:  (Benefits and precautions of occupational therapy have been discussed with the patient.)  Self Care Training  Therapeutic Activity  Therapeutic Exercise/HEP  Neuromuscular Re-education  Manual Therapy  Education         TREATMENT:     EVALUATION: MODERATE COMPLEXITY: (Untimed Charge)    TREATMENT:   Self Care (10 minutes): Patient participated in upper body dressing and grooming ADLs in unsupported sitting and standing with moderate verbal cueing to increase independence. Patient also participated in functional mobility training to increase independence, increase activity tolerance, and increase safety awareness. Based on results of today's evaluation, co-treatment by PT/OT at future sessions is likely not warranted.        TREATMENT GRID:  N/A    AFTER TREATMENT PRECAUTIONS: Alarm Activated, Bed/Chair Locked, Call light within reach, Chair, Needs within reach, and RN notified    INTERDISCIPLINARY COLLABORATION:  RN/ PCT, PT/ PTA, and OT/ QUINTEROS    EDUCATION:  Education Given To: Patient  Education Provided: Role of Therapy;Plan of Care  Education Outcome: Continued education needed    TOTAL TREATMENT DURATION AND TIME:  Time In: 3920  Time Out: 0920  Minutes: [address], OT
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
## 2022-10-03 NOTE — CARE COORDINATION
Pt is a 64 yo female admitted due to dysarthria and confusion, concern for a CVA/TIA. CM consult received and chart reviewed. CM met with pt/dtr to complete CM assessment and discuss dc needs. Demographics and insurance confirmed. Dtr confirmed that the pt's long term plan is to live in [facility] near her. The pt's spouse is still in [place] and plans to move here in the next month. Pt does not plan to go to her's son's home in [place] for the month as originally planned. Pt does not have a local PCP. She is agreeable to referral to a [facility] PCP (preferably [facility] where her dtr goes). Referral submitted to Physician Services. PT/OT/ST carrasco complete with the recommendation for [facility] services at [place]. Pt/dtr expressed no preference of agency so referral submitted to [facility] for [facility] RN/PT/OT/SLP services. Pt has all needed DME in the home and reported no difficulty with affording medications. No other dc needs or concerns identified or reported. CM to continue to follow to assist as needed. 10/03/22 1442   Service Assessment   Patient Orientation Alert and Oriented   Cognition Alert   History Provided By Patient; Child/Family;Medical Record   Primary Caregiver Family   Support Systems Spouse/Significant Other;Family Members   PCP Verified by CM Yes  (pt needs local PCP- referral made to Physician Services for [facility] PCP)   Last Visit to PCP Within last 3 months   Prior Functional Level Independent in ADLs/IADLs;Assistance with the following:;Mobility   Current Functional Level Independent in ADLs/IADLs;Assistance with the following:;Mobility   Can patient return to prior living arrangement Yes   Ability to make needs known: Good   Family able to assist with home care needs: Yes   Financial Resources Medicare;Medicaid   Social/Functional History   Lives With Daughter  (temporarily?)   Type of [id number] Rollator;Walker, rolling   Receives Help From Family   ADL Assistance Independent Homemaking Assistance Needs assistance   Meal Prep Other (comment)  (needs assist due to low vision)   Ambulation Assistance Independent  (rollator as needed)   Transfer Assistance Independent   Active  No   Patient's  Info daughter   Occupation Retired   Discharge Planning   Type of [place] Family Members; Children   Current Services Prior To Admission None   Potential Assistance Needed Home Care; Other (Comment)  (new PCP)   DME Ordered? No   Potential Assistance Purchasing Medications No   Type of Home Care Services OT;PT;Nursing Services;Skilled Therapy  (SLP)   Patient expects to be discharged to: [address] Discharge   Transition of Care Consult (CM Consult) Home Health;Discharge Planning   Internal [employer] Discharge Home Health; Outpatient care transitions;PT;OT;SLP;Nursing services  [facility] PCP)   [facility] Information Provided? No   Mode of Transport at Discharge Other (see comment)  (daughter)   Confirm Follow Up Transport Family   Condition of Participation: Discharge Planning   The Plan for Transition of Care is related to the following treatment goals: Home health nursing and therapy services to improve pt's strength, balance and functional abilities   The Patient and/or Patient Representative was provided with a Choice of Provider? Patient   The Patient and/Or Patient Representative agree with the Discharge Plan? Yes   Freedom of Choice list was provided with basic dialogue that supports the patient's individualized plan of care/goals, treatment preferences, and shares the quality data associated with the providers?   Yes

## 2022-10-03 NOTE — PROGRESS NOTES
Left message for nurse to complete MRI screening form. [FreeTextEntry1] : Will evaluate with EMG/NCV today of bilateral hands.\par \par Cognitive complaints are multifactorial - ?sleep apnea, elevated blood sugar - \par We discussed optimization of health as this may improve cognition.\par Agreeble to sleep study\par Discussed weight loss and Noom.\par Encouraged physical, mental and social activity.\par \par I am not convinced of the Covid vaccine causing her hand pain - I can certainly understand\par \par \par Addendum:\par EMG/NCV shows sensory neuropathy but no evidence of median nerve entrapment. \par She does have ulnar nerve entrapment on the right which is silent (i.e. not cubital tunnel as she has no complaints).\par Will obtain x-rays. Will ask Dr. Kelsey to consider cortisone injections.
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
## 2022-10-03 NOTE — PROGRESS NOTES
ACUTE PHYSICAL THERAPY GOALS:   (Developed with and agreed upon by patient and/or caregiver.)    (1.) Stewart Cheek  will move from supine to sit and sit to supine  with SUPERVISION within 7 treatment day(s). (2.) Stewart Cheek will transfer from bed to chair and chair to bed with SUPERVISION using the least restrictive device within 7 treatment day(s). (3.) Stewart Cheek will ambulate with SUPERVISION for 500 feet with the least restrictive device within 7 treatment day(s). (4.) Stewart Cheek will perform standing static and dynamic balance activities x 20 minutes with SUPERVISION to improve safety within 7 treatment day(s). (5.) Stewart Cheek will perform bilateral lower extremity exercises x 20 min for HEP with SUPERVISION to improve strength, endurance, and functional mobility within 7 treatment day(s). PHYSICAL THERAPY Initial Assessment, Daily Note, and AM  (Link to Caseload Tracking: PT Visit Days : 1  Acknowledge Orders  Time In/Out  PT Charge Capture  Rehab Caseload Tracker    Stewart Cheek is a 65 y.o. female   PRIMARY DIAGNOSIS: Dysarthria  Dysarthria [R47.1]  Confusion [R41.0]  Speech disturbance, unspecified type [R47.9]       Reason for Referral: Difficulty in walking, Not elsewhere classified (R26.2)  Other abnormalities of gait and mobility (R26.89)  Observation: Payor: MEDICARE / Plan: MEDICARE PART A AND B / Product Type: *No Product type* /     ASSESSMENT:     REHAB RECOMMENDATIONS:   Recommendation to date pending progress:  [address] [place] [address]  Would benefit from 24/ supervision    Equipment:    To Be Determined     ASSESSMENT:  Ms. Stephon Salcedo  is a 65year old F who presents with AMS, dysarthria. Baseline LOF not clear due to pt's confusion, no family currently at bedside. Pt is oriented to self and location, not oriented to year. She reports she typically only uses a rollator for community distances and is independent.  This date pt performs mobility including bed mobility with CGA. She was able to ambulate 100 ft with RW and CGA. Dynamic standing balance activities, transfer to chair with CGA. Decreased gait speed and minor unsteadiness noted but overall pt moves well. Pt is mainly limited by cognition, would recommend full time supervision for safety. Pt will benefit from skilled therapy services to address stated deficits to promote return to highest level of function, independence, and safety. Will continue to follow. [employer] [facility] 6 Clicks Basic Mobility Inpatient Short Form  AM-PAC Mobility Inpatient   How much difficulty turning over in bed?: A Little  How much difficulty sitting down on / standing up from a chair with arms?: A Little  How much difficulty moving from lying on back to sitting on side of bed?: A Little  How much help from another person moving to and from a bed to a chair?: A Little  How much help from another person needed to walk in hospital room?: A Little  How much help from another person for climbing 3-5 steps with a railing?: A Little  [facility] Inpatient Mobility Raw Score : 18  AM-PAC Inpatient T-Scale Score : 43.63  Mobility Inpatient CMS 0-100% Score: 46.58  Mobility Inpatient CMS G-Code Modifier : CK    SUBJECTIVE:   Ms. Dimas Kendrick states, \"I' know you guys like this walker better\"     Social/Functional Lives With: Daughter (temporarily?)  Home Equipment: Rollator, Walker, rolling  Has the patient had two or more falls in the past year or any fall with injury in the past year?: No  Receives Help From: Family  ADL Assistance: Independent  Homemaking Assistance: Needs assistance  Meal Prep:  Other (comment) (needs assist due to low vision)  Ambulation Assistance: Independent (rollator as needed)  Active : No    OBJECTIVE:     PAIN: VITALS / O2: PRECAUTION / Anguiano Julieta / DRAINS:   Pre Treatment:   Pain Assessment: None - Denies Pain      Post Treatment: 0 Vitals        Oxygen      Telemetry RESTRICTIONS/PRECAUTIONS:  Restrictions/Precautions: Fall Risk                 GROSS EVALUATION: B LE Intact Impaired (Comments):   AROM [x]     PROM []    Strength []  Mild weakness   Balance [] Posture: Good  Sitting - Static: Good  Sitting - Dynamic: Good  Standing - Static: Fair, +  Standing - Dynamic: Fair   Posture [] Forward Head  Rounded Shoulders   Sensation []     Coordination []      Tone []     Edema []    Activity Tolerance [] Patient Tolerated treatment well, Treatment limited secondary to decreased cognition    []      COGNITION/  PERCEPTION: Intact Impaired (Comments):   Orientation [] Oriented to place, Oriented to person, Oriented to situation, Disoriented to time   Vision []  Tunnel vision   Hearing []  Hearing aids   Cognition  [] Overall Cognitive Status: Exceptions  Safety Judgement: Decreased awareness of need for assistance  Insights: Decreased awareness of deficits     MOBILITY: I Mod I S SBA CGA Min Mod Max Total  NT x2 Comments:   Bed Mobility    Rolling [] [] [] [] [x] [] [] [] [] [] []    Supine to Sit [] [] [] [] [x] [] [] [] [] [] []    Scooting [] [] [] [] [x] [] [] [] [] [] []    Sit to Supine [] [] [] [] [] [] [] [] [] [x] []    Transfers    Sit to Stand [] [] [] [] [x] [] [] [] [] [] []    Bed to Chair [] [] [] [] [x] [] [] [] [] [] []    Stand to Sit [] [] [] [] [x] [] [] [] [] [] []     [] [] [] [] [] [] [] [] [] [] []    I=Independent, Mod I=Modified Independent, S=Supervision, SBA=Standby Assistance, CGA=Contact Guard Assistance,   Min=Minimal Assistance, Mod=Moderate Assistance, Max=Maximal Assistance, Total=Total Assistance, NT=Not Tested    GAIT: I Mod I S SBA CGA Min Mod Max Total  NT x2 Comments:   Level of Assistance [] [] [] [] [x] [] [] [] [] [] []    Distance 100 feet    DME Rolling Walker    Gait Quality Decreased tu , Decreased step length, and Trunk sway increased    Weightbearing Status Restrictions/Precautions  Restrictions/Precautions: Fall Risk Stairs      I=Independent, Mod I=Modified Independent, S=Supervision, SBA=Standby Assistance, CGA=Contact Guard Assistance,   Min=Minimal Assistance, Mod=Moderate Assistance, Max=Maximal Assistance, Total=Total Assistance, NT=Not Tested    PLAN:   [address]: 3 times/week for duration of hospital stay or until stated goals are met, whichever comes first.    THERAPY PROGNOSIS: Good    PROBLEM LIST:   (Skilled intervention is medically necessary to address:)  Decreased ADL/Functional Activities  Decreased Activity Tolerance  Decreased Balance  Decreased Cognition  Decreased Gait Ability  Decreased Safety Awareness  Decreased Strength  Decreased Transfer Abilities INTERVENTIONS PLANNED:   (Benefits and precautions of physical therapy have been discussed with the patient.)  Self Care Training  Therapeutic Activity  Therapeutic Exercise/HEP  Neuromuscular Re-education  Gait Training  Education       TREATMENT:   EVALUATION: MODERATE COMPLEXITY: (Untimed Charge)    TREATMENT:   Co-Treatment PT/OT necessary due to patient's decreased overall endurance/tolerance levels, as well as need for high level skilled assistance to complete functional transfers/mobility and functional tasks  Therapeutic Activity (15 Minutes): Therapeutic activity included Rolling, Supine to Sit, Scooting, Transfer Training, Ambulation on level ground, Sitting balance , and Standing balance to improve functional Activity tolerance, Balance, Mobility, and Strength. TREATMENT GRID:  N/A    AFTER TREATMENT PRECAUTIONS: Alarm Activated, Bed/Chair Locked, Call light within reach, Needs within reach, and RN notified    INTERDISCIPLINARY COLLABORATION:  RN/ PCT and OT/ QUINTEROS    EDUCATION: Education Given To: Patient  Education Provided: Role of Therapy;Plan of Care; Fall Prevention Strategies; Equipment;Transfer Training;Precautions  Education Method: Verbal  Barriers to Learning: Cognition  Education Outcome: Verbalized understanding;Continued education needed    TIME IN/OUT:  Time In: 4096  Time Out: 5179  Minutes: 23    PURA CUNNINGHAM, PT

## 2022-10-03 NOTE — PROGRESS NOTES
OBSERVATION STATUS  SPEECH LANGUAGE PATHOLOGY: DYSPHAGIA  Initial Assessment    NAME: Carissa Paul  : [birth year]  MRN: [id number]    ADMISSION DATE: 10/2/2022  PRIMARY DIAGNOSIS: Dysarthria  Dysarthria [R47.1]  Confusion [R41.0]  Speech disturbance, unspecified type [R47.9]    ICD-10: Treatment Diagnosis: R13.11 Dysphagia, Oral Phase    RECOMMENDATIONS   Diet:  Diet Solids Recommendation: Regular  Liquid Consistency Recommendation: Thin    Medications: PO     Recommendations: Assistance with meals; Supervision with meals    Therapeutic Interventions: Patient/Family education (cognitive linguistic evaluation)   Compensatory Swallowing Strategies: Upright as possible for all oral intake;Remain upright for 30-45 minutes after meals;Assist feed   Patient continues to require skilled intervention: Yes  D/C Recommendations: Ongoing speech therapy is recommended during this hospitalization, Ongoing speech therapy is recommended at next level of care     ASSESSMENT    Dysphagia Diagnosis: Swallow function appears [facility]. No dysphagia treatment indicated. Recommend assist with meals due to visual and cognitive impairments. Patient with mildly dysarthric speech and confusion. Per chart, MRI pending. Patient will benefit from cognitive linguistic evaluation during this hospitalization pending imaging results or next level of care.      GENERAL    History of Present Injury/Illness: Ms. Dorothy Sheppard  has a past medical history of Abdominal adhesions, Atrial fibrillation ([address] Atrophic vaginitis, Balance disorder, Bleeding gastric erosion, Cerebrovascular accident (CVA) ([address] Chronic anxiety, COPD (chronic obstructive pulmonary disease) ([address] Diverticulosis, Dyslipidemia, Factor V Leiden mutation ([address] First degree AV block, Hashimoto's thyroiditis, Hx pulmonary embolism, Hypertension, Hypothyroid, Mixed hearing loss, Nonsustained ventricular tachycardia, Palpitation, PUD (peptic ulcer disease), Retinitis pigmentosa, Small bowel obstruction ([address] TIA (transient ischemic attack), Tobacco use, Urinary retention, and Varicose veins of both lower extremities. . She also  has a past surgical history that includes Hysterectomy, total abdominal; Colonoscopy (10/29/2015); laparoscopy (12/11/2014); Upper gastrointestinal endoscopy (07/23/2021); skin biopsy (10/06/2020); skin biopsy (06/08/2020); hx partial colectomy (Right, 06/22/2014); Thyroidectomy; and Appendectomy. General Comment  Comments: oriented to name and hospital. disorieted to time, city/state, situation. Subjective: \"i've been moving states a lot lately\"  Behavior/Cognition: Alert; Cooperative;Confused  Communication Observation: Dysarthria  Follows Directions: Simple    Prior Dysphagia History: none reported     Current Diet : Regular  Current Liquid Diet : Thin    Respiratory Status: Room air              Pain:   Patient does not c/o pain                                        OBJECTIVE    Patient Positioning: Upright in bed   Oral Motor   Labial:  (slightly reduced on left upon retraction)  Oral Hygiene: Moist;Clean  Lingual: Decreased rate  Dentition: Adequate; Some missing teeth  Baseline Vocal Quality:  (nasality appears altered.)    Oropharyngeal Phase:     Assessment Method(s): Observation;Palpation  Consistency Presented: Thin;Pureed;Mixed consistency; Regular  How Presented: Self-fed/presented;SLP-fed/Presented;Spoon;Straw;Successive Swallows;Cup/sip  Bolus Acceptance: No impairment  Bolus Formation/Control: No impairment  Propulsion: No impairment  Oral Residue: None  Initiation of Swallow: No impairment  Laryngeal Elevation: Functional  Aspiration Signs/Symptoms: None  Pharyngeal Phase Characteristics: No impairment, issues, or problems; voice remained clear. PLAN    Duration/Frequency: SLP will follow up for cognitive linguistic evaluation if indicated by imaging or course of hospitalization. Frequency/Duration TBD.      Dysphagia Outcome and Severity Scale (LUISA)  Dysphagia Outcome Severity Scale: Level 6: Within functional limits/Modified independence  Interpretation of Tool: The Dysphagia Outcome and Severity Scale (LUISA) is a simple, easy-to-use, 7-point scale developed to systematically rate the functional severity of dysphagia based on objective assessment and make recommendations for diet level, independence level, and type of nutrition.    Normal(7), Functional(6), Mild(5), Mild-Moderate(4), Moderate(3), Moderate-Severe(2), Severe(1)    Speech Therapy Prognosis  Prognosis: Good    Education: Patient  Patient Education: role of SLP, diet consistencies/recommendations, aspiration precautions  Patient Education Response: Verbalizes understanding, Needs reinforcement    PRECAUTIONS/ALLERGIES: Adhesive tape, Menthol, Sulfadiazine, and Sulfites   Safety Devices in place: Yes  Type of devices: Call light within reach, Left in bed    Therapy Time  SLP Individual Minutes  Time In: 0825  Time Out: 2299  Minutes: [address] [place], [place]  10/3/2022 8:44 AM

## 2022-10-04 VITALS
HEIGHT: 67 IN | DIASTOLIC BLOOD PRESSURE: 52 MMHG | SYSTOLIC BLOOD PRESSURE: 110 MMHG | HEART RATE: 73 BPM | BODY MASS INDEX: 18.05 KG/M2 | OXYGEN SATURATION: 95 % | WEIGHT: 115 LBS | RESPIRATION RATE: 16 BRPM | TEMPERATURE: 98.2 F

## 2022-10-04 LAB
ANION GAP SERPL CALC-SCNC: 6 MMOL/L (ref 4–13)
BASOPHILS # BLD: 0 K/UL (ref 0–0.2)
BASOPHILS NFR BLD: 1 % (ref 0–2)
BUN SERPL-MCNC: 8 MG/DL (ref 8–23)
CALCIUM SERPL-MCNC: 9.2 MG/DL (ref 8.3–10.4)
CHLORIDE SERPL-SCNC: 102 MMOL/L (ref 101–110)
CO2 SERPL-SCNC: 31 MMOL/L (ref 21–32)
CREAT SERPL-MCNC: 0.7 MG/DL (ref 0.6–1)
DIFFERENTIAL METHOD BLD: ABNORMAL
EOSINOPHIL # BLD: 0.6 K/UL (ref 0–0.8)
EOSINOPHIL NFR BLD: 10 % (ref 0.5–7.8)
ERYTHROCYTE [DISTWIDTH] IN BLOOD BY AUTOMATED COUNT: 12.5 % (ref 11.9–14.6)
GLUCOSE SERPL-MCNC: 99 MG/DL (ref 65–100)
HCT VFR BLD AUTO: 40.7 % (ref 35.8–46.3)
HGB BLD-MCNC: 13.5 G/DL (ref 11.7–15.4)
IMM GRANULOCYTES # BLD AUTO: 0 K/UL (ref 0–0.5)
IMM GRANULOCYTES NFR BLD AUTO: 0 % (ref 0–5)
LYMPHOCYTES # BLD: 1.7 K/UL (ref 0.5–4.6)
LYMPHOCYTES NFR BLD: 27 % (ref 13–44)
MAGNESIUM SERPL-MCNC: 1.9 MG/DL (ref 1.8–2.4)
MCH RBC QN AUTO: 32.3 PG (ref 26.1–32.9)
MCHC RBC AUTO-ENTMCNC: 33.2 G/DL (ref 31.4–35)
MCV RBC AUTO: 97.4 FL (ref 79.6–97.8)
MONOCYTES # BLD: 0.8 K/UL (ref 0.1–1.3)
MONOCYTES NFR BLD: 12 % (ref 4–12)
NEUTS SEG # BLD: 3.1 K/UL (ref 1.7–8.2)
NEUTS SEG NFR BLD: 50 % (ref 43–78)
NRBC # BLD: 0 K/UL (ref 0–0.2)
PLATELET # BLD AUTO: 192 K/UL (ref 150–450)
PMV BLD AUTO: 10.4 FL (ref 9.4–12.3)
POTASSIUM SERPL-SCNC: 3.2 MMOL/L (ref 3.5–5.1)
RBC # BLD AUTO: 4.18 M/UL (ref 4.05–5.2)
SODIUM SERPL-SCNC: 139 MMOL/L (ref 136–145)
WBC # BLD AUTO: 6.2 K/UL (ref 4.3–11.1)

## 2022-10-04 PROCEDURE — 1100000000 HC RM PRIVATE

## 2022-10-04 PROCEDURE — 6370000000 HC RX 637 (ALT 250 FOR IP): Performed by: NURSE PRACTITIONER

## 2022-10-04 PROCEDURE — 83735 ASSAY OF MAGNESIUM: CPT

## 2022-10-04 PROCEDURE — 97530 THERAPEUTIC ACTIVITIES: CPT

## 2022-10-04 PROCEDURE — 6370000000 HC RX 637 (ALT 250 FOR IP): Performed by: FAMILY MEDICINE

## 2022-10-04 PROCEDURE — G0378 HOSPITAL OBSERVATION PER HR: HCPCS

## 2022-10-04 PROCEDURE — 2580000003 HC RX 258: Performed by: FAMILY MEDICINE

## 2022-10-04 PROCEDURE — 36415 COLL VENOUS BLD VENIPUNCTURE: CPT

## 2022-10-04 PROCEDURE — 97535 SELF CARE MNGMENT TRAINING: CPT

## 2022-10-04 PROCEDURE — 80048 BASIC METABOLIC PNL TOTAL CA: CPT

## 2022-10-04 PROCEDURE — 85025 COMPLETE CBC W/AUTO DIFF WBC: CPT

## 2022-10-04 PROCEDURE — 99232 SBSQ HOSP IP/OBS MODERATE 35: CPT | Performed by: NURSE PRACTITIONER

## 2022-10-04 PROCEDURE — 97116 GAIT TRAINING THERAPY: CPT

## 2022-10-04 RX ORDER — POTASSIUM CHLORIDE 20 MEQ/1
40 TABLET, EXTENDED RELEASE ORAL ONCE
Status: COMPLETED | OUTPATIENT
Start: 2022-10-04 | End: 2022-10-04

## 2022-10-04 RX ORDER — LISINOPRIL 5 MG/1
10 TABLET ORAL DAILY
Status: DISCONTINUED | OUTPATIENT
Start: 2022-10-04 | End: 2022-10-04 | Stop reason: HOSPADM

## 2022-10-04 RX ORDER — ASPIRIN 81 MG/1
81 TABLET ORAL DAILY
Qty: 30 TABLET | Refills: 3 | Status: SHIPPED | OUTPATIENT
Start: 2022-10-05 | End: 2022-10-04 | Stop reason: HOSPADM

## 2022-10-04 RX ORDER — POTASSIUM CHLORIDE 20 MEQ/1
20 TABLET, EXTENDED RELEASE ORAL DAILY
Qty: 4 TABLET | Refills: 0 | Status: SHIPPED | OUTPATIENT
Start: 2022-10-05 | End: 2022-10-09

## 2022-10-04 RX ORDER — POTASSIUM CHLORIDE 750 MG/1
10 TABLET, EXTENDED RELEASE ORAL DAILY
Qty: 30 TABLET | Refills: 0 | Status: SHIPPED | OUTPATIENT
Start: 2022-10-09 | End: 2022-11-08

## 2022-10-04 RX ORDER — ATORVASTATIN CALCIUM 80 MG/1
80 TABLET, FILM COATED ORAL NIGHTLY
Qty: 30 TABLET | Refills: 0 | Status: SHIPPED | OUTPATIENT
Start: 2022-10-04 | End: 2022-11-03

## 2022-10-04 RX ADMIN — FAMOTIDINE 20 MG: 20 TABLET, FILM COATED ORAL at 09:12

## 2022-10-04 RX ADMIN — SODIUM CHLORIDE, PRESERVATIVE FREE 10 ML: 5 INJECTION INTRAVENOUS at 09:23

## 2022-10-04 RX ADMIN — LEVOTHYROXINE SODIUM 88 MCG: 0.09 TABLET ORAL at 05:12

## 2022-10-04 RX ADMIN — POTASSIUM CHLORIDE 40 MEQ: 1500 TABLET, EXTENDED RELEASE ORAL at 11:37

## 2022-10-04 RX ADMIN — POTASSIUM CHLORIDE 40 MEQ: 1500 TABLET, EXTENDED RELEASE ORAL at 09:09

## 2022-10-04 RX ADMIN — APIXABAN 5 MG: 5 TABLET, FILM COATED ORAL at 09:09

## 2022-10-04 RX ADMIN — VITAMIN D, TAB 1000IU (100/BT) 1000 UNITS: 25 TAB at 09:12

## 2022-10-04 RX ADMIN — FLECAINIDE ACETATE 100 MG: 100 TABLET ORAL at 09:12

## 2022-10-04 RX ADMIN — ESCITALOPRAM OXALATE 10 MG: 10 TABLET ORAL at 09:12

## 2022-10-04 RX ADMIN — FUROSEMIDE 20 MG: 20 TABLET ORAL at 09:09

## 2022-10-04 RX ADMIN — LISINOPRIL 10 MG: 5 TABLET ORAL at 10:10

## 2022-10-04 ASSESSMENT — PAIN SCALES - GENERAL
PAINLEVEL_OUTOF10: 0
PAINLEVEL_OUTOF10: 4

## 2022-10-04 ASSESSMENT — PAIN DESCRIPTION - LOCATION: LOCATION: LEG

## 2022-10-04 ASSESSMENT — PAIN DESCRIPTION - ORIENTATION: ORIENTATION: RIGHT

## 2022-10-04 NOTE — PROGRESS NOTES
ACUTE OCCUPATIONAL THERAPY GOALS:   (Developed with and agreed upon by patient and/or caregiver.)  1. Patient will complete lower body bathing and dressing with setup and adaptive equipment as needed. 2. Patient will complete toileting with supervision. 3. Patient will tolerate 30 minutes of OT treatment with as neeeded rest breaks to increase activity tolerance for ADLs. 4. Patient will complete functional transfers with supervision and adaptive equipment as needed. Timeframe: 7 visits        OCCUPATIONAL THERAPY: Daily Note PM   OT Visit Days: 2   Time  OT Charge Capture  Rehab Caseload Tracker  OT Orders    Gray Lim is a 65 y.o. female   PRIMARY DIAGNOSIS: Dysarthria  Dysarthria [R47.1]  Confusion [R41.0]  Speech disturbance, unspecified type [R47.9]       Inpatient: Payor: [facility] / Plan: [place] MEDICARE HMO/PPO / Product Type: *No Product type* /     ASSESSMENT:     REHAB RECOMMENDATIONS: CURRENT LEVEL OF FUNCTION:  (Most Recently Demonstrated)   Recommendation to date pending progress:  Setting:  Home Health Therapy    Equipment:    To Be Determined Bathing:  Minimal Assist  Dressing:  Minimal Assist  Feeding/Grooming:  Contact Guard Assist  Toileting:  Not Tested  Functional Mobility:  Contact Guard Assist     ASSESSMENT:  Ms. Shara Roman is a 66 y/o female who presented after episode of confusion. Relevant PMH of CVA, legal blindness, and [place]. Has been staying with her daughter in [place] since moving from [place]. Reports independence with all ADLs/ IADLs except cooking due to vision loss. This date, min A sit<> stand from EOB, CGA for mobility to sink with RW, min A for dressing and bathing tasks. Today pt presents with decreased strength and activity tolerance impacting ADLs. Pt is currently functioning below baseline and would benefit from skilled OT services to address OT goals and plan of care.  Rec HHOT at d/c. Daughter stated today that pt will hopefully be going to an assisted living facility that the daughter toured today. SUBJECTIVE:     Ms. Jono Cazares states, \"I'd like to get washed up\"     Social/Functional Lives With: Daughter (temporarily?)  Type of Home: House  Home Equipment: Rollator, Eletha Bouquet, rolling  Has the patient had two or more falls in the past year or any fall with injury in the past year?: No  Receives Help From: Family  ADL Assistance: Independent  Homemaking Assistance: Needs assistance  Meal Prep:  Other (comment) (needs assist due to low vision)  Ambulation Assistance: Independent (rollator as needed)  Transfer Assistance: Independent  Active : No  Patient's  Info: daughter  Occupation: Retired    OBJECTIVE:     Thea Gamez / Everardo Chavez / Fatemeh Apple: None    RESTRICTIONS/PRECAUTIONS:  Restrictions/Precautions  Restrictions/Precautions: Fall Risk        PAIN: VITALS / O2:   Pre Treatment:    Did not c/o pain          Post Treatment: did not c/o pain, resting comfortably Vitals          Oxygen            MOBILITY: I Mod I S SBA CGA Min Mod Max Total  NT x2 Comments:   Bed Mobility    Rolling [] [] [] [] [] [] [] [] [] [] []    Supine to Sit [] [] [] [x] [] [] [] [] [] [] []    Scooting [] [] [] [x] [] [] [] [] [] [] []    Sit to Supine [] [] [] [] [] [] [] [] [] [] []    Transfers    Sit to Stand [] [] [] [] [] [x] [] [] [] [] []    Bed to Chair [] [] [] [] [x] [] [] [] [] [] []    Stand to Sit [] [] [] [] [] [x] [] [] [] [] []    Tub/Shower [] [] [] [] [] [] [] [] [] [] []     Toilet [] [] [] [] [] [] [] [] [] [] []      [] [] [] [] [] [] [] [] [] [] []    I=Independent, Mod I=Modified Independent, S=Supervision/Setup, SBA=Standby Assistance, CGA=Contact Guard Assistance, Min=Minimal Assistance, Mod=Moderate Assistance, Max=Maximal Assistance, Total=Total Assistance, NT=Not Tested    ACTIVITIES OF DAILY LIVING: I Mod I S SBA CGA Min Mod Max Total NT Comments   BASIC ADLs:              Upper Body   Bathing [] [] [] [] [] [x] [] [] [] [] Seated EOB   Lower Body Bathing [] [] [] [] [] [] [] [] [] []    Toileting [] [] [] [] [] [] [] [] [] []    Upper Body Dressing [] [] [] [] [] [x] [] [] [] [] Seated EOB   Lower Body Dressing [] [] [] [] [] [x] [] [] [] [] Seated EOB   Feeding [] [] [] [] [] [] [] [] [] []    Grooming [] [] [] [] [] [x] [] [] [] [] Standing EOS   Personal Device Care [] [] [] [] [] [] [] [] [] []    Functional Mobility [] [] [] [] [x] [] [] [] [] [] With RW   I=Independent, Mod I=Modified Independent, S=Supervision/Setup, SBA=Standby Assistance, CGA=Contact Guard Assistance, Min=Minimal Assistance, Mod=Moderate Assistance, Max=Maximal Assistance, Total=Total Assistance, NT=Not Tested    BALANCE: Good Fair+ Fair Fair- Poor NT Comments   Sitting Static [] [x] [] [] [] []    Sitting Dynamic [] [] [x] [] [] []              Standing Static [] [x] [] [] [] []    Standing Dynamic [] [] [x] [] [] []        PLAN:     FREQUENCY/DURATION   OT Plan of Care: 3 times/week for duration of hospital stay or until stated goals are met, whichever comes first.    TREATMENT:     TREATMENT:   Self Care (35 minutes): Patient participated in upper body bathing, upper body dressing, lower body dressing, and grooming ADLs in unsupported sitting and standing with minimal verbal and tactile cueing to increase independence, decrease assistance required, and increase activity tolerance. Patient also participated in functional mobility training to increase independence and increase activity tolerance.      TREATMENT GRID:  N/A    AFTER TREATMENT PRECAUTIONS: Bed/Chair Locked, Call light within reach, Chair, Needs within reach, and Visitors at bedside    INTERDISCIPLINARY COLLABORATION:  RN/ PCT and PT/ PTA    EDUCATION:       TOTAL TREATMENT DURATION AND TIME:  Time In: 1452  Time Out: [address]  Minutes: Steffany Cool

## 2022-10-04 NOTE — DISCHARGE INSTR - COC
Continuity of Care Form    Patient Name: Cheri Hurtado   :  [age]  MRN:  [id number]    Admit date:  10/2/2022  Discharge date:  ***    Code Status Order: Full Code   Advance Directives:     Admitting Physician:  iNko Marks MD  PCP: Crystal Feng DO    Discharging Nurse: [facility] Unit/Room#:   Discharging Unit Phone Number: ***    Emergency Contact:   Extended Emergency Contact Information  Primary Emergency Contact: [facility] Phone: [phone]  Relation: Child  Secondary Emergency Contact: Nik Mckenzie  Mobile Phone: [phone]  Relation: None    Past Surgical History:  Past Surgical History:   Procedure Laterality Date    APPENDECTOMY      COLONOSCOPY  10/29/2015    HX PARTIAL COLECTOMY Right 2014    HYSTERECTOMY, TOTAL ABDOMINAL (CERVIX REMOVED)      LAPAROSCOPY  2014    Exploratory laparotomy with extensive lysis of adhesions    SKIN BIOPSY  10/06/2020    skin biopsy of back: Atypical squamoproliferative lesion.     SKIN BIOPSY  2020    skin right lower leg: Hemorrhage, fibrosis (scarring), and nonspecific inflammation, negative for tumor    THYROIDECTOMY      UPPER GASTROINTESTINAL ENDOSCOPY  2021    EGD       Immunization History:   Immunization History   Administered Date(s) Administered    COVID-19, MODERNA BLUE border, Primary or Immunocompromised, (age 12y+), IM, 100 mcg/0.5mL 2021, 2021    Influenza Virus Vaccine 10/05/2009    Influenza, High Dose (Fluzone 65 yrs and older) 2021    MMR 2020    Pneumococcal Conjugate 13-valent (Szmpztn84) 2015, 2017    Pneumococcal Polysaccharide (Abtarxlxv57) 2018    Tdap (Boostrix, Adacel) 2020    Zoster Recombinant (Shingrix) 11/15/2018       Active Problems:  Patient Active Problem List   Diagnosis Code    Factor V Leiden mutation ([facility] [address] [facility] D68.51    Impairment of balance R26.89    Atrophic vaginitis N95.2    Chronic anxiety F41.9    Diverticulosis Wound Length (cm) 18.5 cm 10/03/22 0813   Wound Width (cm) 4 cm 10/03/22 0813   Wound Depth (cm) 0.1 cm 10/03/22 0813   Wound Surface Area (cm^2) 74 cm^2 10/03/22 0813   Wound Volume (cm^3) 7.4 cm^3 10/03/22 0813   Wound Assessment Pink/red;Slough 10/03/22 0813   Drainage Amount Moderate 10/03/22 0813   Drainage Description Serosanguinous 10/03/22 0813   Odor None 10/03/22 0813   Negrita-wound Assessment Blanchable erythema 10/03/22 0813   Margins Defined edges 10/03/22 0813   Number of days: 1        Elimination:  Continence: Bowel: {YES / SD:19454}  Bladder: {YES / HV:89597}  Urinary Catheter: {Urinary Catheter:209601712}   Colostomy/Ileostomy/Ileal Conduit: {YES / MS:03446}       Date of Last BM: ***    Intake/Output Summary (Last 24 hours) at 10/4/2022 1457  Last data filed at 10/4/2022 0915  Gross per 24 hour   Intake 120 ml   Output --   Net 120 ml     I/O last 3 completed shifts:   In: 240 [P.O.:240]  Out: -     Safety Concerns:     [address] [employer] Safety Concerns:716915432}    Impairments/Disabilities:      [address] [employer] Impairments/Disabilities:924179507}    Nutrition Therapy:  Current Nutrition Therapy:   [address] [employer] Diet List:419376896}    Routes of Feeding: {CHP DME Other Feedings:266772040}  Liquids: {Slp liquid thickness:30123}  Daily Fluid Restriction: {CHP DME Yes amt example:232519889}  Last Modified Barium Swallow with Video (Video Swallowing Test): {Done Not Done ZYDI:081419890}    Treatments at the Time of Hospital Discharge:   Respiratory Treatments: ***  Oxygen Therapy:  {Therapy; copd oxygen:48814}  Ventilator:    {MH CC Vent MSUY:521479415}    Rehab Therapies: {THERAPEUTIC INTERVENTION:8310423736}  Weight Bearing Status/Restrictions: [address] Nima  Weight Bearin}  Other Medical Equipment (for information only, NOT a DME order):  {EQUIPMENT:413221518}  Other Treatments: ***    Patient's personal belongings (please select all that are sent with patient):  {P DME Belongings:592416158}    RN SIGNATURE: {Esignature:532903672}    CASE MANAGEMENT/SOCIAL WORK SECTION    Inpatient Status Date: ***    Readmission Risk Assessment Score:  Readmission Risk              Risk of Unplanned Readmission:  11           Discharging to Facility/ Agency   Name:   Address:  Phone:  Fax:    Dialysis Facility (if applicable)   Name:  Address:  Dialysis Schedule:  Phone:  Fax:    / signature: {Esignature:455167675}    PHYSICIAN SECTION    Prognosis: {Prognosis:0108479663}    Condition at Discharge: [address] [place] [address] Patient Condition:699735314}    Rehab Potential (if transferring to Rehab): {Prognosis:8899071325}    Recommended Labs or Other Treatments After Discharge: ***    Physician Certification: I certify the above information and transfer of Amy Best  is necessary for the continuing treatment of the diagnosis listed and that she requires {Admit to Appropriate Level of Care:58413} for {GREATER/LESS:769830878} 30 days.      Update Admission H&P: {[facility]}    PHYSICIAN SIGNATURE:  {Esignature:979408457}

## 2022-10-04 NOTE — PROGRESS NOTES
ACUTE PHYSICAL THERAPY GOALS:   (Developed with and agreed upon by patient and/or caregiver.)  (1.) Radha Cerda  will move from supine to sit and sit to supine  with SUPERVISION within 7 treatment day(s). (2.) Radha Cerda will transfer from bed to chair and chair to bed with SUPERVISION using the least restrictive device within 7 treatment day(s). (3.) Radha Cerda will ambulate with SUPERVISION for 500 feet with the least restrictive device within 7 treatment day(s). (4.) Radha Cerda will perform standing static and dynamic balance activities x 20 minutes with SUPERVISION to improve safety within 7 treatment day(s). (5.) Radha Cerda will perform bilateral lower extremity exercises x 20 min for HEP with SUPERVISION to improve strength, endurance, and functional mobility within 7 treatment day(s). PHYSICAL THERAPY: Daily Note PM   (Link to Caseload Tracking: PT Visit Days : 2  Time In/Out PT Charge Capture  Rehab Caseload Tracker  Orders    Radha Cerda is a 65 y.o. female   PRIMARY DIAGNOSIS: Dysarthria  Dysarthria [R47.1]  Confusion [R41.0]  Speech disturbance, unspecified type [R47.9]       Inpatient: Payor: Jose Lovelace / Plan: [facility] MEDICARE HMO/PPO / Product Type: *No Product type* /     ASSESSMENT:     REHAB RECOMMENDATIONS:   Recommendation to date pending progress:  Setting:  Home Health Therapy    Equipment:    Rolling Walker     ASSESSMENT:  Ms. Higinio Reyez is in bathroom upon arrival to room and performs transfers/amb in her room with SBA but does need frequent verbal cueing for safety. She ambulates in hallway using 2WW with steady gait but again needs cueing for AD management and attention to task. Family present and educated on safety in home environment, use of equipment, and importance of safety with mobility/fall prevention. Patient continues to perform well with therapy. Recommending HH and 24 hr supervision at d/c for safety due to current cognitive deficits. Daughter states patient will be going to an independent living apartment at d/c. Apartment is located close to her daughter and patient's  will also be moving in with her as well (he is currently still in [place]). SUBJECTIVE:   Ms. Mat Berumen states, \"I get the shakes if I walk too fast\"     Social/Functional Lives With: Daughter (temporarily?)  Type of Home: House  Home Equipment: Rollator, Kt Star, rolling  Has the patient had two or more falls in the past year or any fall with injury in the past year?: No  Receives Help From: Family  ADL Assistance: Independent  Homemaking Assistance: Needs assistance  Meal Prep:  Other (comment) (needs assist due to low vision)  Ambulation Assistance: Independent (rollator as needed)  Transfer Assistance: Independent  Active : No  Patient's  Info: daughter  Occupation: Retired  OBJECTIVE:     PAIN: VITALS / O2: PRECAUTION / Nelma Kelch / DRAINS:   Pre Treatment:   Pain Assessment: 0-10  Pain Level: 4  Pain Location: Leg  Pain Orientation: Right  Non-Pharmaceutical Pain Intervention(s): Ambulation/Increased Activity      Post Treatment: No change Vitals        Oxygen    None    RESTRICTIONS/PRECAUTIONS:  Restrictions/Precautions  Restrictions/Precautions: Fall Risk  Restrictions/Precautions: Fall Risk     MOBILITY: I Mod I S SBA CGA Min Mod Max Total  NT x2 Comments:   Bed Mobility    Rolling [] [] [] [] [] [] [] [] [] [x] []    Supine to Sit [] [] [] [] [] [] [] [] [] [x] []    Scooting [] [] [] [] [] [] [] [] [] [x] []    Sit to Supine [] [] [] [] [] [] [] [] [] [x] []    Transfers    Sit to Stand [] [] [] [x] [] [] [] [] [] [] []    Bed to Chair [] [] [] [x] [] [] [] [] [] [] [] Given cues for safety    Stand to Sit [] [] [] [x] [] [] [] [] [] [] []     [] [] [] [] [] [] [] [] [] [] []    I=Independent, Mod I=Modified Independent, S=Supervision, SBA=Standby Assistance, CGA=Contact Guard Assistance,   Min=Minimal Assistance, Mod=Moderate Assistance, Max=Maximal Assistance, Total=Total Assistance, NT=Not Tested    BALANCE: Good Fair+ Fair Fair- Poor NT Comments   Sitting Static [x] [] [] [] [] []    Sitting Dynamic [x] [] [] [] [] []              Standing Static [] [] [x] [] [] []    Standing Dynamic [] [] [] [x] [] []      GAIT: I Mod I S SBA CGA Min Mod Max Total  NT x2 Comments:   Level of Assistance [] [] [] [] [x] [] [] [] [] [] [] Given verbal cueing for safety and AD management    Distance 200 feet    DME Rolling Walker    Gait Quality Decreased step clearance, Decreased step length, and Decreased stance    Weightbearing Status      Stairs      I=Independent, Mod I=Modified Independent, S=Supervision, SBA=Standby Assistance, CGA=Contact Guard Assistance,   Min=Minimal Assistance, Mod=Moderate Assistance, Max=Maximal Assistance, Total=Total Assistance, NT=Not Tested    PLAN:   FREQUENCY AND DURATION: 3 times/week for duration of hospital stay or until stated goals are met, whichever comes first.    TREATMENT:   TREATMENT:   Therapeutic Activity (10 Minutes): Therapeutic activity included Transfer Training and Standing balance to improve functional Activity tolerance, Balance, Coordination, Mobility, and Strength. Gait Training (13 Minutes): Gait training for 200 feet utilizing [facility]. Patient required Verbal cueing to improve Activity Pacing, Assistive Device Utilization, and Dynamic Standing Balance. TREATMENT GRID:  N/A    AFTER TREATMENT PRECAUTIONS: Bed/Chair Locked, Chair, RN notified, and Visitors at bedside    INTERDISCIPLINARY COLLABORATION:  RN/ PCT and PT/ PTA    EDUCATION: Education Given To: Patient; Family  Education Provided: Plan of Care;Transfer Training;Equipment; Fall Prevention Strategies  Education Method: Verbal  Barriers to Learning: Cognition  Education Outcome: Verbalized understanding    TIME IN/OUT:  Time In: [address]  Time Out: [address] [place]  Minutes: [address] , [address] [facility]
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
## 2022-10-04 NOTE — PROGRESS NOTES
Neurology Daily Progress Note     Assessment:     65year old female with altered mental status. Episode is not entirely clear, but appears resolved. It sounds consistent with a moment of disorientation, which may have been related to patient's recent move. MRI was unremarkable. Plan:     Management of Delirium      Non-pharmacological intervention  Reorient the patient throughout the day  Window open and lights on during the day. Lights off, television off, noises down during the night. If able, decrease nursing checks at night  Therapies as often as possible  Avoid restraints to the best of your ability   Avoid sensory deprivation by using glasses and hearing aids, if applicable        Pharmacological intervention  Replete electrolyte abnormalities and correct metabolic abnormalities  Limit benzodiazepines, antihistamines, narcotics, anticholinergics. Preference towards Precedex for sedation over fentanyl and benzodiazepines/GABAa agonists. For dangerous behavior/aggression to self or others can consider Zyprexa or Seroquel if benefits outweigh risk  For persistent insomnia can use melatonin four hours prior to bedtime or Seroquel 25 mg at bedtime   We will sign off. Subjective: Interval history:    Patient doing well. No further episodes of confusion. MRI unremarkable. History:    Stewart Cheek is a 65 y.o. female who is being seen for ams.     Past Medical History:   Diagnosis Date    Abdominal adhesions     Atrial fibrillation [facility])     s/p cardioversion 7/08    Atrophic vaginitis     Balance disorder     Bleeding gastric erosion     has had EGD    Cerebrovascular accident (CVA) ([facility] [address]     Chronic anxiety     COPD (chronic obstructive pulmonary disease) ([facility] [address]     Diverticulosis     Dyslipidemia     Factor V Leiden mutation ([facility] [address]     First degree AV block     see's Dr. Roque Ayala    Hashimoto's thyroiditis     Hx pulmonary embolism     Hypertension     Hypothyroid     Mixed hearing loss Nonsustained ventricular tachycardia     Palpitation     PUD (peptic ulcer disease)     Patient had a small esophageal ulcer. Follow-up EGD showed complete resolution    Retinitis pigmentosa     Small bowel obstruction (HCC)     TIA (transient ischemic attack)     Tobacco use     Urinary retention     Varicose veins of both lower extremities     has seen vascular     Past Surgical History:   Procedure Laterality Date    APPENDECTOMY      COLONOSCOPY  10/29/2015    HX PARTIAL COLECTOMY Right 2014    HYSTERECTOMY, TOTAL ABDOMINAL (CERVIX REMOVED)      LAPAROSCOPY  2014    Exploratory laparotomy with extensive lysis of adhesions    SKIN BIOPSY  10/06/2020    skin biopsy of back: Atypical squamoproliferative lesion.     SKIN BIOPSY  2020    skin right lower leg: Hemorrhage, fibrosis (scarring), and nonspecific inflammation, negative for tumor    THYROIDECTOMY      UPPER GASTROINTESTINAL ENDOSCOPY  2021    EGD      Family History   Problem Relation Age of Onset    Cancer Other     Cancer Mother     Cancer Father     Stroke Other      Social History     Tobacco Use    Smoking status: Former     Types: Cigarettes     Quit date: 2010     Years since quittin.7    Smokeless tobacco: Never   Substance Use Topics    Alcohol use: Not on file      Current Facility-Administered Medications   Medication Dose Route Frequency Provider Last Rate Last Admin    lisinopril (PRINIVIL;ZESTRIL) tablet 10 mg  10 mg Oral Daily VICTOR HUGO [place] - CNP   10 mg at 10/04/22 1010    potassium chloride (KLOR-CON M) extended release tablet 40 mEq  40 mEq Oral Once José Polk MD        metoprolol succinate (TOPROL XL) extended release tablet 50 mg  50 mg Oral Daily Dayna Duane, APRN - CNP   50 mg at 10/03/22 0948    furosemide (LASIX) tablet 20 mg  20 mg Oral Daily Dayna Duane, APRN - CNP   20 mg at 10/04/22 0909    sodium chloride flush 0.9 % injection 5-40 mL  5-40 mL IntraVENous 2 times per day Eliezer Rondon Mario Avelar MD   10 mL at 10/04/22 0923    sodium chloride flush 0.9 % injection 5-40 mL  5-40 mL IntraVENous PRN Rashawn Landers MD        0.9 % sodium chloride infusion   IntraVENous PRN Rashawn Landers MD        ondansetron (ZOFRAN-ODT) disintegrating tablet 4 mg  4 mg Oral Q8H PRN Rashawn Landers MD        Or    ondansetron (ZOFRAN) injection 4 mg  4 mg IntraVENous Q6H PRN Rashawn Landers MD        polyethylene glycol (GLYCOLAX) packet 17 g  17 g Oral Daily PRN Rashawn Landers MD        acetaminophen (TYLENOL) tablet 650 mg  650 mg Oral Q6H PRN Rashawn Landers MD   650 mg at 10/02/22 2116    Or    acetaminophen (TYLENOL) suppository 650 mg  650 mg Rectal Q6H PRN Rashawn Landers MD        albuterol sulfate HFA (PROVENTIL;VENTOLIN;PROAIR) 108 (90 Base) MCG/ACT inhaler 2 puff  2 puff Inhalation Q4H PRN Rashawn Landers MD        apixaban (ELIQUIS) tablet 5 mg  5 mg Oral BID Rashawn Landers MD   5 mg at 10/04/22 0909    escitalopram (LEXAPRO) tablet 10 mg  10 mg Oral Daily Rashawn Landers MD   10 mg at 10/04/22 0912    famotidine (PEPCID) tablet 20 mg  20 mg Oral BID Rashawn Landers MD   20 mg at 10/04/22 0912    flecainide (TAMBOCOR) tablet 100 mg  100 mg Oral BID Rashawn Landers MD   100 mg at 10/04/22 0912    levothyroxine (SYNTHROID) tablet 88 mcg  88 mcg Oral QAM AC Rashawn Landers MD   88 mcg at 10/04/22 5354    vitamin D (CHOLECALCIFEROL) tablet 1,000 Units  1,000 Units Oral Daily Rashawn Landers MD   1,000 Units at 10/04/22 0912    aspirin EC tablet 81 mg  81 mg Oral Daily Rashawn Landers MD   81 mg at 10/02/22 1549    Or    aspirin suppository 300 mg  300 mg Rectal Daily Rashawn Landers MD        labetalol (NORMODYNE;TRANDATE) injection 10 mg  10 mg IntraVENous Q10 Min PRN Rashawn Landers MD        atorvastatin (LIPITOR) tablet 80 mg  80 mg Oral Nightly Rashawn Landers MD   80 mg at 10/03/22 2045        Allergies   Allergen Reactions    Adhesive Tape Itching    Menthol      Other reaction(s): Unknown (comments)    Sulfadiazine      Other reaction(s): Unknown (comments)    Sulfites Other (See Comments)       Review of systems negative with exception of pertinent positives and negatives noted above.        Objective:     Vitals:    10/02/22 0834 10/04/22 0000 10/04/22 0400 10/04/22 0800   BP:  (!) 141/80 (!) 145/90 (!) 168/95   Pulse:  73 70 59   Resp:  18 18 15   Temp:  98.4 °F (36.9 °C) 98.1 °F (36.7 °C) 97.5 °F (36.4 °C)   TempSrc:  Oral Oral Oral   SpO2:  95% 90% 90%   Weight: 115 lb (52.2 kg)      Height: 5' 7\" (1.702 m)             Current Facility-Administered Medications:     lisinopril (PRINIVIL;ZESTRIL) tablet 10 mg, 10 mg, Oral, Daily, Leda Sandhoff, APRN - CNP, 10 mg at 10/04/22 1010    potassium chloride (KLOR-CON M) extended release tablet 40 mEq, 40 mEq, Oral, Once, Leyla Davison MD    metoprolol succinate (TOPROL XL) extended release tablet 50 mg, 50 mg, Oral, Daily, VICTOR HUGO Evangelista CNP, 50 mg at 10/03/22 0948    furosemide (LASIX) tablet 20 mg, 20 mg, Oral, Daily, VICTOR HUGO Evangelista CNP, 20 mg at 10/04/22 0909    sodium chloride flush 0.9 % injection 5-40 mL, 5-40 mL, IntraVENous, 2 times per day, Leyla Davison MD, 10 mL at 10/04/22 0923    sodium chloride flush 0.9 % injection 5-40 mL, 5-40 mL, IntraVENous, PRN, Leyla Davison MD    0.9 % sodium chloride infusion, , IntraVENous, PRN, Leyla Davison MD    ondansetron (ZOFRAN-ODT) disintegrating tablet 4 mg, 4 mg, Oral, Q8H PRN **OR** ondansetron (ZOFRAN) injection 4 mg, 4 mg, IntraVENous, Q6H PRN, Leyla Davison MD    polyethylene glycol (GLYCOLAX) packet 17 g, 17 g, Oral, Daily PRN, Leyla Davison MD    acetaminophen (TYLENOL) tablet 650 mg, 650 mg, Oral, Q6H PRN, 650 mg at 10/02/22 2116 **OR** acetaminophen (TYLENOL) suppository 650 mg, 650 mg, Rectal, Q6H PRN, Leyla Davison MD    albuterol sulfate HFA (PROVENTIL;VENTOLIN;PROAIR) 108 (90 Base) MCG/ACT inhaler 2 puff, 2 puff, Inhalation, Q4H PRN, Leyla Davison, MD    apixaban (ELIQUIS) tablet 5 mg, 5 mg, Oral, BID, Lona aDvis MD, 5 mg at 10/04/22 0909    escitalopram (LEXAPRO) tablet 10 mg, 10 mg, Oral, Daily, Lona Davis MD, 10 mg at 10/04/22 0912    famotidine (PEPCID) tablet 20 mg, 20 mg, Oral, BID, Lona Davis MD, 20 mg at 10/04/22 0912    flecainide (TAMBOCOR) tablet 100 mg, 100 mg, Oral, BID, Lona Davis MD, 100 mg at 10/04/22 0912    levothyroxine (SYNTHROID) tablet 88 mcg, 88 mcg, Oral, QAM AC, Lona Davis MD, 88 mcg at 10/04/22 7673    vitamin D (CHOLECALCIFEROL) tablet 1,000 Units, 1,000 Units, Oral, Daily, Lona Davis MD, 1,000 Units at 10/04/22 0912    aspirin EC tablet 81 mg, 81 mg, Oral, Daily, 81 mg at 10/02/22 1549 **OR** aspirin suppository 300 mg, 300 mg, Rectal, Daily, Lona Davis MD    labetalol (NORMODYNE;TRANDATE) injection 10 mg, 10 mg, IntraVENous, Q10 Min PRN, Lona Davis MD    atorvastatin (LIPITOR) tablet 80 mg, 80 mg, Oral, Nightly, Lona Davis MD, 80 mg at 10/03/22 2045    Recent Results (from the past 12 hour(s))   CBC with Auto Differential    Collection Time: 10/04/22  5:27 AM   Result Value Ref Range    WBC 6.2 4.3 - 11.1 K/uL    RBC 4.18 4.05 - 5.2 M/uL    Hemoglobin 13.5 11.7 - 15.4 g/dL    Hematocrit 40.7 35.8 - 46.3 %    MCV 97.4 79.6 - 97.8 FL    MCH 32.3 26.1 - 32.9 PG    MCHC 33.2 31.4 - 35.0 g/dL    RDW 12.5 11.9 - 14.6 %    Platelets 828 552 - 045 K/uL    MPV 10.4 9.4 - 12.3 FL    nRBC 0.00 0.0 - 0.2 K/uL    Differential Type AUTOMATED      Seg Neutrophils 50 43 - 78 %    Lymphocytes 27 13 - 44 %    Monocytes 12 4.0 - 12.0 %    Eosinophils % 10 (H) 0.5 - 7.8 %    Basophils 1 0.0 - 2.0 %    Immature Granulocytes 0 0.0 - 5.0 %    Segs Absolute 3.1 1.7 - 8.2 K/UL    Absolute Lymph # 1.7 0.5 - 4.6 K/UL    Absolute Mono # 0.8 0.1 - 1.3 K/UL    Absolute Eos # 0.6 0.0 - 0.8 K/UL    Basophils Absolute 0.0 0.0 - 0.2 K/UL    Absolute Immature Granulocyte 0.0 0.0 - 0.5 K/UL   Basic Metabolic Panel w/ Reflex to MG    Collection Time: 10/04/22  5:27 AM   Result Value Ref Range    Sodium 139 136 - 145 mmol/L    Potassium 3.2 (L) 3.5 - 5.1 mmol/L    Chloride 102 101 - 110 mmol/L    CO2 31 21 - 32 mmol/L    Anion Gap 6 4 - 13 mmol/L    Glucose 99 65 - 100 mg/dL    BUN 8 8 - 23 MG/DL    Creatinine 0.70 0.6 - 1.0 MG/DL    Est, Glom Filt Rate >60 >60 ml/min/1.73m2    Calcium 9.2 8.3 - 10.4 MG/DL   Magnesium    Collection Time: 10/04/22  5:27 AM   Result Value Ref Range    Magnesium 1.9 1.8 - 2.4 mg/dL       CT Results (most recent):  CT reviewed. Results do not populate into note    Most recent MRI   Results for orders placed during the hospital encounter of 10/02/22    MRI BRAIN W WO CONTRAST    Narrative  EXAMINATION: BRAIN MRI 10/3/2022 1:35 PM    ACCESSION NUMBER: [id number]    INDICATION: dyarthria r/o cva    COMPARISON: Head CT 10/2/2022, same day MR angiogram of the head    TECHNIQUE: Multiplanar multisequence MRI of the brain without and with  intravenous administration of 20 cc ProHance contrast agent. FINDINGS:    Midline structures including the corpus callosum, pituitary gland, optic nerves,  and cerebellum are well developed. The ventricles are within normal limits for the degree of global brain  parenchymal volume loss. There is no midline shift. The basilar cisterns are  patent. There is no cerebellar tonsillar ectopia or herniation. There are T2 hyperintensities in the periventricular and subcortical white  matter, a nonspecific finding which likely represents chronic microangiopathy. There is encephalomalacia and gliosis along the lateral aspect of the right  precentral gyrus at the site of a prior insult. Prior bilateral lens replacement. Diffusion imaging shows no evidence of acute infarction or other acute  abnormality. Atrophic right maxillary sinus. The expected large central intracranial vascular flow voids are preserved.  No  definite intracranial blood products within the limits of motion degradation on  the gradient images. The thin cut postcontrast images are severely motion degraded. There is no gross  evidence of abnormal intracranial postcontrast enhancement within the limits of  motion degradation on the spin-echo postcontrast images. No suspicious osseous lesions within the limits of motion degradation. Small  osteoma of the outer table of the right frontal bone. Impression  1. No evidence of acute infarction. 2. Moderate burden of chronic microangiopathy. 3. Right precentral gyrus encephalomalacia and gliosis at the site of a prior  insult. 4. Otherwise severely motion degraded exam, particularly the postcontrast  images. If there are persistent or progressive unexplained symptoms, a repeat  exam could be considered when the patient is better able to lie still. Most recent MRA   Results for orders placed during the hospital encounter of 10/02/22    MRA NECK W CONTRAST    Narrative  Neck MRA    History: Altered mental status    Sequences: Contiguous axial 3D time-of-flight images of the neck and upper chest  were used to generate maximum intensity projection images of the cervicocerebral  arch and neck vasculature. All measurements are based upon NASCET criteria if  appropriate. Comparison: None. 20 cc of ProHance administered    Findings: The signal within the visualized aortic arch demonstrates bovine arch. No  evidence of significant proximal great vessel stenosis. Imaging of the right neck vasculature demonstrates no evidence of signal loss to  suggest carotid stenosis. Review of the left neck demonstrates the carotid bulb to be patent however there  is marked tortuosity of the proximal internal carotid artery which likely  presents with kink-like stenoses. The vertebral arteries are patent along their course. The V4 segment on the  right is diminutive. Impression  1.  Negative Neck MRA        Most recent CTA  No results found for this or any previous visit. Most recent Echo  Results for orders placed during the hospital encounter of 10/02/22    Transthoracic echocardiogram (TTE) complete with contrast, bubble, strain, and 3D PRN    Interpretation Summary    Left Ventricle: Normal left ventricular systolic function with a visually estimated EF of 60 - 65%. Left ventricle size is normal. Normal wall thickness. Normal wall motion. Aortic Valve: Mild regurgitation. Interatrial Septum: Agitated saline study was negative with and without provocation. Technical qualifiers: Color flow Doppler was performed and pulse wave and/or continuous wave Doppler was performed. Contrast used: Definity. Physical Exam:  General - Well developed, well nourished, in no apparent distress. Pleasant and conversant. HEENT - Normocephalic, atraumatic. Conjunctiva, tympanic membranes, and oropharynx are clear. Neck - Supple without masses, no bruits   Extremities - Peripheral pulses intact. No edema and no rashes. Neurological examination -   Comprehension, attention , memory and reasoning are intact. Poor orientation. Language and speech are normal. On cranial nerve examination pupils are equal round and reactive to light. Extraocular motility is normal. Face is mildly asymmetric with a possible droop on the left. Hearing is intact to finger rustle bilaterally. Motor examination - There is normal muscle tone and bulk. Power is full throughout. Muscle stretch reflexes are normoactive and there are no pathological reflexes present. Sensation is intact to light touch, pinprick, vibration and proprioception in all extremities. Cerebellar examination is normal.        I spent 25 minutes today with the patient, which included chart review, documentation, and greater than 50% of time was spent in direct face-to-face contact, obtaining history, exam, coordinating care, and counseling the patient on medical condition.      Signed By: Mary Reddy, APRN     October 4, 2022

## 2022-10-04 NOTE — CARE COORDINATION
Pt is medically cleared for dc to home today with [facility] RN/PT/OT/SLP services through [facility]. Referral to [facility] PCP. Dtr is working on securing and long term apartment for the patient at [employer]. CM provided her with information about placement assistance services and other senior resources. No other dc needs or concerns identified or reported. CM remains available to assist as needed. 10/03/22 3842   Service Assessment   Patient Orientation Alert and Oriented   Cognition Alert   History Provided By Patient; Child/Family;Medical Record   Primary Caregiver Family   Support Systems Spouse/Significant Other;Family Members   PCP Verified by CM Yes  (pt needs local PCP- referral made to Physician Services for [facility] PCP)   Last Visit to PCP Within last 3 months   Prior Functional Level Independent in ADLs/IADLs;Assistance with the following:;Mobility   Current Functional Level Independent in ADLs/IADLs;Assistance with the following:;Mobility   Can patient return to prior living arrangement Yes   Ability to make needs known: Good   Family able to assist with home care needs: Yes   Financial Resources Medicare;Medicaid   Social/Functional History   Lives With Daughter  (temporarily?)   Type of [address] Needs assistance   [employer] Other (comment)  (needs assist due to low vision)   Ambulation Assistance Independent  (rollator as needed)   Transfer Assistance Independent   Active  No   Patient's  Info daughter   Occupation Retired   Discharge Planning   Type of [facility] Family Members; Children   Current Services Prior To Admission None   Potential Assistance Needed Home Care; Other (Comment)  (new PCP)   DME Ordered?  No   Potential Assistance Purchasing Medications No   Type of Home Care Services OT;PT;Nursing Services;Skilled Therapy  (SLP)   Patient expects to be discharged to: [facility] [address] Discharge   Transition of Care Consult (CM Consult) Home Health;Discharge Planning   Internal [address] Discharge Home Health; Outpatient care transitions;PT;OT;SLP;Nursing services  [facility] PCP)   [employer] Information Provided? No   Mode of Transport at Discharge Other (see comment)  (daughter)   Confirm Follow Up Transport Family   Condition of Participation: Discharge Planning   The Plan for Transition of Care is related to the following treatment goals: Home health nursing and therapy services to improve pt's strength, balance and functional abilities   The Patient and/or Patient Representative was provided with a Choice of Provider? Patient   The Patient and/Or Patient Representative agree with the Discharge Plan? Yes   Freedom of Choice list was provided with basic dialogue that supports the patient's individualized plan of care/goals, treatment preferences, and shares the quality data associated with the providers?   Yes

## 2022-10-05 ENCOUNTER — HOME CARE VISIT (OUTPATIENT)
Dept: SCHEDULING | Facility: HOME HEALTH | Age: 77
End: 2022-10-05
Payer: MEDICARE

## 2022-10-05 VITALS
RESPIRATION RATE: 18 BRPM | BODY MASS INDEX: 20.09 KG/M2 | DIASTOLIC BLOOD PRESSURE: 88 MMHG | HEART RATE: 70 BPM | SYSTOLIC BLOOD PRESSURE: 140 MMHG | HEIGHT: 66 IN | TEMPERATURE: 97 F | WEIGHT: 125 LBS | OXYGEN SATURATION: 94 %

## 2022-10-05 PROCEDURE — G0299 HHS/HOSPICE OF RN EA 15 MIN: HCPCS

## 2022-10-05 PROCEDURE — 400013 HH SOC

## 2022-10-05 PROCEDURE — 1090000002 HH PPS REVENUE DEBIT

## 2022-10-05 PROCEDURE — 0221000100 HH NO PAY CLAIM PROCEDURE

## 2022-10-05 PROCEDURE — 1090000001 HH PPS REVENUE CREDIT

## 2022-10-05 ASSESSMENT — ENCOUNTER SYMPTOMS: DYSPNEA ACTIVITY LEVEL: AFTER AMBULATING MORE THAN 20 FT

## 2022-10-06 ENCOUNTER — HOME CARE VISIT (OUTPATIENT)
Dept: SCHEDULING | Facility: HOME HEALTH | Age: 77
End: 2022-10-06
Payer: MEDICARE

## 2022-10-06 VITALS
RESPIRATION RATE: 18 BRPM | DIASTOLIC BLOOD PRESSURE: 88 MMHG | TEMPERATURE: 97.1 F | OXYGEN SATURATION: 96 % | SYSTOLIC BLOOD PRESSURE: 142 MMHG | HEART RATE: 74 BPM

## 2022-10-06 PROCEDURE — G0153 HHCP-SVS OF S/L PATH,EA 15MN: HCPCS

## 2022-10-06 PROCEDURE — G0299 HHS/HOSPICE OF RN EA 15 MIN: HCPCS

## 2022-10-06 PROCEDURE — 1090000002 HH PPS REVENUE DEBIT

## 2022-10-06 PROCEDURE — 1090000001 HH PPS REVENUE CREDIT

## 2022-10-06 ASSESSMENT — ENCOUNTER SYMPTOMS: PAIN LOCATION - PAIN QUALITY: SORE

## 2022-10-07 ENCOUNTER — HOME CARE VISIT (OUTPATIENT)
Dept: SCHEDULING | Facility: HOME HEALTH | Age: 77
End: 2022-10-07
Payer: MEDICARE

## 2022-10-07 VITALS
DIASTOLIC BLOOD PRESSURE: 87 MMHG | SYSTOLIC BLOOD PRESSURE: 137 MMHG | OXYGEN SATURATION: 94 % | TEMPERATURE: 97.7 F | HEART RATE: 75 BPM | RESPIRATION RATE: 18 BRPM

## 2022-10-07 PROCEDURE — 1090000001 HH PPS REVENUE CREDIT

## 2022-10-07 PROCEDURE — G0299 HHS/HOSPICE OF RN EA 15 MIN: HCPCS

## 2022-10-07 PROCEDURE — 1090000002 HH PPS REVENUE DEBIT

## 2022-10-08 VITALS
SYSTOLIC BLOOD PRESSURE: 139 MMHG | RESPIRATION RATE: 18 BRPM | TEMPERATURE: 98.2 F | OXYGEN SATURATION: 94 % | HEART RATE: 80 BPM | DIASTOLIC BLOOD PRESSURE: 85 MMHG

## 2022-10-11 ENCOUNTER — HOME CARE VISIT (OUTPATIENT)
Dept: SCHEDULING | Facility: HOME HEALTH | Age: 77
End: 2022-10-11
Payer: MEDICARE

## 2022-10-14 ENCOUNTER — HOME CARE VISIT (OUTPATIENT)
Dept: HOME HEALTH SERVICES | Facility: HOME HEALTH | Age: 77
End: 2022-10-14
Payer: MEDICARE

## 2022-10-14 ENCOUNTER — HOME CARE VISIT (OUTPATIENT)
Dept: SCHEDULING | Facility: HOME HEALTH | Age: 77
End: 2022-10-14
Payer: MEDICARE

## 2022-10-17 ENCOUNTER — HOME CARE VISIT (OUTPATIENT)
Dept: HOME HEALTH SERVICES | Facility: HOME HEALTH | Age: 77
End: 2022-10-17
Payer: MEDICARE

## 2022-10-19 ENCOUNTER — HOME CARE VISIT (OUTPATIENT)
Dept: HOME HEALTH SERVICES | Facility: HOME HEALTH | Age: 77
End: 2022-10-19
Payer: MEDICARE